# Patient Record
Sex: FEMALE | Race: WHITE | Employment: FULL TIME | URBAN - METROPOLITAN AREA
[De-identification: names, ages, dates, MRNs, and addresses within clinical notes are randomized per-mention and may not be internally consistent; named-entity substitution may affect disease eponyms.]

---

## 2017-01-18 PROBLEM — Z3A.30 30 WEEKS GESTATION OF PREGNANCY: Status: ACTIVE | Noted: 2017-01-18

## 2017-02-23 PROBLEM — Z3A.35 35 WEEKS GESTATION OF PREGNANCY: Status: ACTIVE | Noted: 2017-02-23

## 2017-02-23 PROBLEM — Z3A.30 30 WEEKS GESTATION OF PREGNANCY: Status: RESOLVED | Noted: 2017-01-18 | Resolved: 2017-02-23

## 2018-07-17 ENCOUNTER — HOSPITAL ENCOUNTER (EMERGENCY)
Age: 33
Discharge: HOME | End: 2018-07-17
Payer: COMMERCIAL

## 2018-07-17 VITALS
TEMPERATURE: 98.7 F | BODY MASS INDEX: 39.65 KG/M2 | WEIGHT: 210 LBS | OXYGEN SATURATION: 99 % | DIASTOLIC BLOOD PRESSURE: 84 MMHG | HEIGHT: 61 IN | HEART RATE: 94 BPM | RESPIRATION RATE: 16 BRPM | SYSTOLIC BLOOD PRESSURE: 137 MMHG

## 2018-07-17 DIAGNOSIS — J02.9 PHARYNGITIS, UNSPECIFIED ETIOLOGY: Primary | ICD-10-CM

## 2018-07-17 DIAGNOSIS — R59.1 LYMPHADENOPATHY: ICD-10-CM

## 2018-07-17 PROCEDURE — 99282 EMERGENCY DEPT VISIT SF MDM: CPT

## 2018-07-17 ASSESSMENT — PAIN SCALES - GENERAL: PAINLEVEL_OUTOF10: 2

## 2018-07-17 ASSESSMENT — PAIN DESCRIPTION - DESCRIPTORS: DESCRIPTORS: ACHING

## 2018-07-17 ASSESSMENT — PAIN DESCRIPTION - PAIN TYPE: TYPE: ACUTE PAIN

## 2019-04-11 ENCOUNTER — INITIAL PRENATAL (OUTPATIENT)
Dept: OBGYN CLINIC | Age: 34
End: 2019-04-11
Payer: COMMERCIAL

## 2019-04-11 VITALS
SYSTOLIC BLOOD PRESSURE: 130 MMHG | BODY MASS INDEX: 41.45 KG/M2 | DIASTOLIC BLOOD PRESSURE: 85 MMHG | HEART RATE: 104 BPM | WEIGHT: 219.38 LBS

## 2019-04-11 DIAGNOSIS — D25.1 INTRAMURAL AND SUBMUCOUS LEIOMYOMA OF UTERUS: ICD-10-CM

## 2019-04-11 DIAGNOSIS — O09.299 HISTORY OF GESTATIONAL DIABETES IN PRIOR PREGNANCY, CURRENTLY PREGNANT: ICD-10-CM

## 2019-04-11 DIAGNOSIS — O09.529 ANTEPARTUM MULTIGRAVIDA OF ADVANCED MATERNAL AGE: ICD-10-CM

## 2019-04-11 DIAGNOSIS — Z3A.25 25 WEEKS GESTATION OF PREGNANCY: Primary | ICD-10-CM

## 2019-04-11 DIAGNOSIS — O34.219 PREVIOUS CESAREAN DELIVERY AFFECTING PREGNANCY, ANTEPARTUM: ICD-10-CM

## 2019-04-11 DIAGNOSIS — Z31.5 ENCOUNTER FOR PROCREATIVE GENETIC COUNSELING: ICD-10-CM

## 2019-04-11 DIAGNOSIS — D25.0 INTRAMURAL AND SUBMUCOUS LEIOMYOMA OF UTERUS: ICD-10-CM

## 2019-04-11 DIAGNOSIS — Z86.32 HISTORY OF GESTATIONAL DIABETES IN PRIOR PREGNANCY, CURRENTLY PREGNANT: ICD-10-CM

## 2019-04-11 LAB
GLUCOSE URINE, POC: NEGATIVE
PROTEIN UA: POSITIVE

## 2019-04-11 PROCEDURE — 81002 URINALYSIS NONAUTO W/O SCOPE: CPT | Performed by: OBSTETRICS & GYNECOLOGY

## 2019-04-11 PROCEDURE — 99212 OFFICE O/P EST SF 10 MIN: CPT | Performed by: OBSTETRICS & GYNECOLOGY

## 2019-04-11 PROCEDURE — 99201 HC NEW PT, E/M LEVEL 1: CPT | Performed by: OBSTETRICS & GYNECOLOGY

## 2019-04-11 PROCEDURE — 76811 OB US DETAILED SNGL FETUS: CPT | Performed by: OBSTETRICS & GYNECOLOGY

## 2019-04-11 PROCEDURE — 76817 TRANSVAGINAL US OBSTETRIC: CPT | Performed by: OBSTETRICS & GYNECOLOGY

## 2019-04-11 NOTE — PATIENT INSTRUCTIONS
more sessions each day. Ease or reduce swelling in your feet, ankles, hands, and fingers  · If your fingers are puffy, take off your rings. · Do not eat high-salt foods, such as potato chips. · Prop up your feet on a stool or couch as much as possible. Sleep with pillows under your feet. · Do not stand for long periods of time or wear tight shoes. · Wear support stockings. Where can you learn more? Go to https://Wallerius.[employer] [place](BAI). org and sign in to your [employer] account. Enter G264 in the [facility] box to learn more about \"Weeks 22 to 26 of Your Pregnancy: Care Instructions. \"     If you do not have an account, please click on the \"Sign Up Now\" link. Current as of: September 5, 2018  Content Version: 11.9  © 3842-6987 coresystems. Care instructions adapted under license by [facility] ([facility]). If you have questions about a medical condition or this instruction, always ask your healthcare professional. [place] [address] any warranty or liability for your use of this information. Patient Education        Learning About When to Call Your Doctor During Pregnancy (After 20 Weeks)  Your Care Instructions  It's common to have concerns about what might be a problem during pregnancy. Although most pregnant women don't have any serious problems, it's important to know when to call your doctor if you have certain symptoms or signs of labor. These are general suggestions. Your doctor may give you some more information about when to call. When to call your doctor (after 20 weeks)  Call 911 anytime you think you may need emergency care. For example, call if:  · You have severe vaginal bleeding. · You have sudden, severe pain in your belly. · You passed out (lost consciousness). · You have a seizure. · You see or feel the umbilical cord.   · You think you are about to deliver your baby and can't make it safely to the hospital.  Call your doctor now or seek

## 2019-04-11 NOTE — PROGRESS NOTES
[facility] ([place], [place]) Fetal Medicine  [address]  [address]  Office [phone]  Fax [phone]    2019    RE:  Mirella Guzman     : 1985   AGE: 26 y.o. Dear Dr. Chay Villagomez,    Thank you for allowing me to provide prenatal consultation for Mirella Guzman. As I'm sure you will recall, Mirella Guzman is a 26 y.o. -0-0-1 No LMP recorded. Patient is pregnant. Estimated Date of Delivery: 19 at 25 weeks seen in our office today for the following: Detailed fetal anatomy exam.    REASON FOR CONSULTATION:  Patient Active Problem List    Diagnosis Date Noted    Previous  delivery affecting pregnancy, antepartum 2019    History of gestational diabetes in prior pregnancy, currently pregnant 2019    Antepartum multigravida of advanced maternal age 2019    Encounter for procreative genetic counseling 2019    25 weeks gestation of pregnancy 2016    Uterine leiomyoma 2016      PAST HISTORY:  OB History    Para Term  AB Living   2 1 1     1   SAB TAB Ectopic Molar Multiple Live Births             1      # Outcome Date GA Lbr Roberto/2nd Weight Sex Delivery Anes PTL Lv   2 Current            1 Term 17 39w4d  6 lb 15 oz (3.147 kg) M CS-LTranv EPI  CAR    GYNECOLOGICAL:  Last pap smear: Normal  Abnormal pap smears requiring surgical treatment, Yes, LEEP procedure. .  Sexually transmitted diseases: negative  negative for HPV, and Negative for other sexually transmitted diseases. MEDICAL:  Past Medical History:   Diagnosis Date    Abnormal Pap smear of cervix     leep procedure     Allergic     Complication of anesthesia     nausea    Diabetes mellitus ([address]     gestational Diabetes with first pregnancy     SURGICAL:   Positive for previous ,  knee surgery X2, neck lymph node excision-benign;  No other surgery    ALLERGIES:    Allergies   Allergen Reactions    Bactrim [Sulfamethoxazole-Trimethoprim] Itching and Rash    INFECTION HISTORY:  OB Infection History    High Risk Hepatitis B/Immunized? No     Live with Someone with or Exposed to TB? No     Patient or Partner has Hx of Genital Herpes? No     Rash or Viral Illness Since LMP? No     History of STD/GC/Chlamydia/HPV/Syphilis? No      Armando Tran was seen in our office today. She has no complaints today but is here for a detailed fetal anatomy exam and advanced maternal age. She reports good fetal movement and no bleeding or fluid leaking. This is the patient's 2nd pregnancy. Her 1st pregnancy was complicated by gestational diabetes. Insulin was administered 3 times a day for glucose control. She also had an 8 cm myoma in the anterior uterine wall which caused pain during her pregnancy. There was some concern about fetal growth and she was induced at 39 weeks gestation. She dilated to 6 cm and then made no further progress. A primary  section was performed for a living baby boy who weighed 6 lbs. 15 oz. PHYSICAL EXAMINATION:  /85   Pulse 104   Wt 219 lb 6 oz (99.5 kg)   Body mass index is 41.45 kg/m². General Appearance: Healthy looking, alert, no acute distress. Eyes: No pallor, no icterus, no photophobia. Abdomen: Soft, non-tender. Extremities: No pretibial pitting edema  Pelvic: deferred  Urine dipstick:  Results for POC orders placed in visit on 19   POCT urine qual dipstick protein   Result Value Ref Range    Protein, UA Positive Negative   POCT urine qual dipstick glucose   Result Value Ref Range    Glucose, UA POC negative      An ultrasound evaluation was done in our office today. Please refer to the enclosed copy of the ultrasound report for further information. Lab Work Review:  reviewed    IMPRESSION:  1) Single intrauterine gestation at 25 weeks with fetal biometry consistent with dates. 2) The fetal anatomy appears normal and the fetal lie is BREECH.   3) The amniotic fluid volume is WNL and the placenta is fundal, posterior. 4) Advanced maternal age- NIPT negative, XX.  11) Previous  section. 6) History of gestational diabetes. 7) Uterine myoma. 8) The cervix is long and closed by transvaginal ultrasound. RECOMMENDATIONS:  Each of the recommendations were discussed with the patient:  1) Single intrauterine gestation at 25 weeks with fetal biometry consistent with dates   2) Normal fetal anatomy and amniotic fluid volume WNL. 3) BREECH lie. 4) Advanced maternal age- NIPT negative, XX.  11) Previous  section. 6) History of gestational diabetes requiring insulin therapy. She is scheduled for a repeat carbohydrate screening test.  7) Uterine myoma. There is an anterior uterine wall myoma that measures 5.5 x 5.3 x 2.6 cm's. 8) The cervix is long and closed by transvaginal ultrasound. 9) I suggested the patient return for a follow-up assessment for fetal growth in 6 weeks unless there is a clinical reason for her to return prior to that time. She is to call if she has any problems or questions prior to her next visit. Further evaluation and management will be dependent on her clinical presentation and the results of her testing. The patient is to continue to follow with you in your office for ongoing obstetric care.     PLAN:    As noted above or sooner prn. Sincerely,        Carine Clark MD    I spent 20 minutes of direct contact time with the patient of which greater than 50% of the time was used to  the patient, discuss complications and problems related to her pregnancy, or coordinating her care.  I answered all of her questions to her satisfaction.

## 2019-04-11 NOTE — PROGRESS NOTES
States baby is moving  Denies bleeding, leakage of fluid or contractions    Call your obstetrician for:    Bleeding, leakage of fluid, abdominal tenderness, headaches, burred vision or  epigastric pain or decreased fetal movement or increased in urinary frequency.    Call if any questions or concerns

## 2019-05-23 ENCOUNTER — ROUTINE PRENATAL (OUTPATIENT)
Dept: OBGYN CLINIC | Age: 34
End: 2019-05-23
Payer: COMMERCIAL

## 2019-05-23 VITALS
SYSTOLIC BLOOD PRESSURE: 114 MMHG | HEART RATE: 99 BPM | WEIGHT: 217.5 LBS | BODY MASS INDEX: 41.1 KG/M2 | DIASTOLIC BLOOD PRESSURE: 82 MMHG

## 2019-05-23 DIAGNOSIS — Z3A.31 31 WEEKS GESTATION OF PREGNANCY: Primary | ICD-10-CM

## 2019-05-23 DIAGNOSIS — D25.0 INTRAMURAL AND SUBMUCOUS LEIOMYOMA OF UTERUS: ICD-10-CM

## 2019-05-23 DIAGNOSIS — O09.529 ANTEPARTUM MULTIGRAVIDA OF ADVANCED MATERNAL AGE: ICD-10-CM

## 2019-05-23 DIAGNOSIS — O24.414 INSULIN CONTROLLED GESTATIONAL DIABETES MELLITUS (GDM) IN THIRD TRIMESTER: ICD-10-CM

## 2019-05-23 DIAGNOSIS — Z86.32 HISTORY OF GESTATIONAL DIABETES IN PRIOR PREGNANCY, CURRENTLY PREGNANT: ICD-10-CM

## 2019-05-23 DIAGNOSIS — O34.219 PREVIOUS CESAREAN DELIVERY AFFECTING PREGNANCY, ANTEPARTUM: ICD-10-CM

## 2019-05-23 DIAGNOSIS — D25.1 INTRAMURAL AND SUBMUCOUS LEIOMYOMA OF UTERUS: ICD-10-CM

## 2019-05-23 DIAGNOSIS — O09.299 HISTORY OF GESTATIONAL DIABETES IN PRIOR PREGNANCY, CURRENTLY PREGNANT: ICD-10-CM

## 2019-05-23 LAB
GLUCOSE URINE, POC: NORMAL
PROTEIN UA: NEGATIVE

## 2019-05-23 PROCEDURE — 81002 URINALYSIS NONAUTO W/O SCOPE: CPT | Performed by: OBSTETRICS & GYNECOLOGY

## 2019-05-23 PROCEDURE — 99211 OFF/OP EST MAY X REQ PHY/QHP: CPT | Performed by: OBSTETRICS & GYNECOLOGY

## 2019-05-23 PROCEDURE — 76819 FETAL BIOPHYS PROFIL W/O NST: CPT | Performed by: OBSTETRICS & GYNECOLOGY

## 2019-05-23 PROCEDURE — 99213 OFFICE O/P EST LOW 20 MIN: CPT | Performed by: OBSTETRICS & GYNECOLOGY

## 2019-05-23 PROCEDURE — 76816 OB US FOLLOW-UP PER FETUS: CPT | Performed by: OBSTETRICS & GYNECOLOGY

## 2019-05-23 NOTE — PROGRESS NOTES
Will send blood sugar log, now on insulin and metformin  Patient states baby is moving, denies bleeding, leakage of fluid or contractions    Call your obstetrician for:    Bleeding, leakage of fluid, abdominal tenderness, headaches, burred vision or  epigastric pain or decreased fetal movement or increased in urinary frequency.    Call if any questions or concerns

## 2019-05-23 NOTE — PATIENT INSTRUCTIONS
Patient Education        Weeks 30 to 28 of Your Pregnancy: Care Instructions  Your Care Instructions    You have made it to the final months of your pregnancy. By now, your baby is really starting to look like a baby, with hair and plump skin. As you enter the final weeks of pregnancy, the reality of having a baby may start to set in. This is the time to settle on a name, get your household in order, set up a safe nursery, and find quality  if needed. Doing these things in advance will allow you to focus on caring for and enjoying your new baby. You may also want to have a tour of your hospital's labor and delivery unit to get a better idea of what to expect while you are in the hospital.  During these last months, it is very important to take good care of yourself and pay attention to what your body needs. If your doctor says it is okay for you to work, don't push yourself too hard. Use the tips provided in this care sheet to ease heartburn and care for varicose veins. If you haven't already had the Tdap shot during this pregnancy, talk to your doctor about getting it. It will help protect your  against pertussis infection. Follow-up care is a key part of your treatment and safety. Be sure to make and go to all appointments, and call your doctor if you are having problems. It's also a good idea to know your test results and keep a list of the medicines you take. How can you care for yourself at home? Pay attention to your baby's movements  · You should feel your baby move several times every day. · Your baby now turns less, and kicks and jabs more. · Your baby sleeps 20 to 45 minutes at a time and is more active at certain times of day. · If your doctor wants you to count your baby's kicks:  ? Empty your bladder, and lie on your side or relax in a comfortable chair. ? Write down your start time. ? Pay attention only to your baby's movements. Count any movement except hiccups. ?  After you have counted 10 movements, write down your stop time. ? Write down how many minutes it took for your baby to move 10 times. ? If an hour goes by and you have not recorded 10 movements, have something to eat or drink and then count for another hour. If you do not record 10 movements in either hour, call your doctor. Ease heartburn  · Eat small, frequent meals. · Do not eat chocolate, peppermint, or very spicy foods. Avoid drinks with caffeine, such as coffee, tea, and sodas. · Avoid bending over or lying down after meals. · Talk a short walk after you eat. · If heartburn is a problem at night, do not eat for 2 hours before bedtime. · Take antacids like Mylanta, Maalox, Rolaids, or Tums. Do not take antacids that have sodium bicarbonate. Care for varicose veins  · Varicose veins are blood vessels that stretch out with the extra blood during pregnancy. Your legs may ache or throb. Most varicose veins will go away after the birth. · Avoid standing for long periods of time. Sit with your legs crossed at the ankles, not the knees. · Sit with your feet propped up. · Avoid tight clothing or stockings. Wear support hose. · Exercise regularly. Try walking for at least 30 minutes a day. Where can you learn more? Go to https://Cardium TherapeuticsrosaReduxio.Karma Snap. org and sign in to your [employer] account. Enter G533 in the [facility] box to learn more about \"Weeks 30 to 32 of Your Pregnancy: Care Instructions. \"     If you do not have an account, please click on the \"Sign Up Now\" link. Current as of: September 5, 2018  Content Version: 12.0  © 7443-0523 [employer]. Care instructions adapted under license by [facility] ([facility]). If you have questions about a medical condition or this instruction, always ask your healthcare professional. [address] any warranty or liability for your use of this information.          Patient Education        Learning About When to Call Your Doctor During Pregnancy (After 20 Weeks)  Your Care Instructions  It's common to have concerns about what might be a problem during pregnancy. Although most pregnant women don't have any serious problems, it's important to know when to call your doctor if you have certain symptoms or signs of labor. These are general suggestions. Your doctor may give you some more information about when to call. When to call your doctor (after 20 weeks)  Call 911 anytime you think you may need emergency care. For example, call if:  · You have severe vaginal bleeding. · You have sudden, severe pain in your belly. · You passed out (lost consciousness). · You have a seizure. · You see or feel the umbilical cord. · You think you are about to deliver your baby and can't make it safely to the hospital.  Call your doctor now or seek immediate medical care if:  · You have vaginal bleeding. · You have belly pain. · You have a fever. · You have symptoms of preeclampsia, such as:  ? Sudden swelling of your face, hands, or feet. ? New vision problems (such as dimness or blurring). ? A severe headache. · You have a sudden release of fluid from your vagina. (You think your water broke.)  · You think that you may be in labor. This means that you've had at least 4 contractions within 20 minutes or at least 8 contractions in an hour. · You notice that your baby has stopped moving or is moving much less than normal.  · You have symptoms of a urinary tract infection. These may include:  ? Pain or burning when you urinate. ? A frequent need to urinate without being able to pass much urine. ? Pain in the flank, which is just below the rib cage and above the waist on either side of the back. ? Blood in your urine. Watch closely for changes in your health, and be sure to contact your doctor if:  · You have vaginal discharge that smells bad. · You have skin changes, such as:  ? A rash. ? Itching. ? Yellow color to your skin.   · You have other concerns about your pregnancy. If you have labor signs at 37 weeks or more  If you have signs of labor at 37 weeks or more, your doctor may tell you to call when your labor becomes more active. Symptoms of active labor include:  · Contractions that are regular. · Contractions that are less than 5 minutes apart. · Contractions that are hard to talk through. Follow-up care is a key part of your treatment and safety. Be sure to make and go to all appointments, and call your doctor if you are having problems. It's also a good idea to know your test results and keep a list of the medicines you take. Where can you learn more? Go to https://Cenifypepiceweb.Ar[employer] Systems. org and sign in to your simpleFLOORS account. Enter  in the [facility] box to learn more about \"Learning About When to Call Your Doctor During Pregnancy (After 20 Weeks). \"     If you do not have an account, please click on the \"Sign Up Now\" link. Current as of: September 5, 2018  Content Version: 12.0  © 6432-6710 [employer]. Care instructions adapted under license by [facility] ([facility]). If you have questions about a medical condition or this instruction, always ask your healthcare professional. [place] [address] any warranty or liability for your use of this information. Patient Education        Counting Your Baby's Kicks: Care Instructions  Your Care Instructions    Counting your baby's kicks is one way your doctor can tell that your baby is healthy. Most women--especially in a first pregnancy--feel their baby move for the first time between 16 and 22 weeks. The movement may feel like flutters rather than kicks. Your baby may move more at certain times of the day. When you are active, you may notice less kicking than when you are resting. At your prenatal visits, your doctor will ask whether the baby is active.   In your last trimester, your doctor may ask you to count the number of times you feel your baby move. Follow-up care is a key part of your treatment and safety. Be sure to make and go to all appointments, and call your doctor if you are having problems. It's also a good idea to know your test results and keep a list of the medicines you take. How do you count fetal kicks? · A common method of checking your baby's movement is to count the number of kicks or moves you feel in 1 hour. Ten movements (such as kicks, flutters, or rolls) in 1 hour are normal. Some doctors suggest that you count in the morning until you get to 10 movements. Then you can quit for that day and start again the next day. · Pick your baby's most active time of day to count. This may be any time from morning to evening. · If you do not feel 10 movements in an hour, your baby may be sleeping. Wait for the next hour and count again. When should you call for help? Call your doctor now or seek immediate medical care if:    · You noticed that your baby has stopped moving or is moving much less than normal.    Watch closely for changes in your health, and be sure to contact your doctor if you have any problems. Where can you learn more? Go to https://blueKiwi Software.PhishLabs. org and sign in to your [employer] (T.O.H.L.) account. Enter K453 in the [facility] box to learn more about \"Counting Your Baby's Kicks: Care Instructions. \"     If you do not have an account, please click on the \"Sign Up Now\" link. Current as of: September 5, 2018  Content Version: 12.0  © 2807-0020 Healthwise, [employer]orporated. Care instructions adapted under license by [facility] [employer] [facility] ([facility]). If you have questions about a medical condition or this instruction, always ask your healthcare professional. [place] [address] any warranty or liability for your use of this information.

## 2019-05-23 NOTE — PROGRESS NOTES
[facility] Fetal Medicine  [facility] [address]  Office [phone]  Fax [phone]    2019    RE:  Dania Vides     : 1985   AGE: 26 y.o. Dear Dr. Arik Newby,    Thank you for allowing me to provide prenatal consultation for Dania Vides. As I'm sure you will recall, Dania Vides is a 26 y.o. -0-0-1 No LMP recorded. Patient is pregnant. Estimated Date of Delivery: 19 at 31 weeks seen in our office today for the following: Fetal growth exam and gestational diabetes. REASON FOR CONSULTATION:  Patient Active Problem List    Diagnosis Date Noted    31 weeks gestation of pregnancy 2019    Insulin controlled gestational diabetes mellitus (GDM) in third trimester 2019    Previous  delivery affecting pregnancy, antepartum 2019    History of gestational diabetes in prior pregnancy, currently pregnant 2019    Antepartum multigravida of advanced maternal age 2019    Encounter for procreative genetic counseling 2019    Uterine leiomyoma 2016        PAST HISTORY:  OB History    Para Term  AB Living   2 1 1     1   SAB TAB Ectopic Molar Multiple Live Births             1      # Outcome Date GA Lbr Roberto/2nd Weight Sex Delivery Anes PTL Lv   2 Current            1 Term 17 39w4d  6 lb 15 oz (3.147 kg) M CS-LTranv EPI  CAR    GYNECOLOGICAL:  Last pap smear: Normal  Abnormal pap smears requiring surgical treatment, Yes, LEEP procedure. .  Sexually transmitted diseases: negative  negative for HPV, and Negative for other sexually transmitted diseases. MEDICAL:  Past Medical History:   Diagnosis Date    Abnormal Pap smear of cervix     leep procedure     Allergic     Complication of anesthesia     nausea    Diabetes mellitus ([address]     gestational Diabetes with first pregnancy     SURGICAL:   Positive for previous ,  knee surgery X2, neck lymph node excision-benign;  No other Review:  reviewed    IMPRESSION:  1) Single intrauterine gestation at 31 weeks with fetal biometry consistent with dates. 2) The fetal anatomy appears normal and the fetal lie is now VERTEX. 3) The amniotic fluid volume is WNL and the placenta is fundal, posterior. 4) Advanced maternal age- NIPT negative, XX.  11) Previous  section. 6) History of gestational diabetes with insulin dependent gestational diabetes now. 7) Uterine myoma- not identified today. 8) The umbilical artery Doppler S/D ratio is wnl. RECOMMENDATIONS:  Each of the recommendations were discussed with the patient:  1) Single intrauterine gestation at 31 weeks with fetal biometry consistent with dates   2) Normal fetal anatomy and amniotic fluid volume WNL. 3) VERTEX lie. 4) Advanced maternal age- NIPT negative, XX.  11) Previous  section. 6) History of gestational diabetes requiring insulin therapy. Her 3 hour OGTT was abnormal and she is currently on Insulin and Metformin as described above. 7) Uterine myoma-not identified today. 8) The umbilical artery Doppler S/D ratio is wnl.  9) I suggested the patient return for a follow-up assessment for fetal growth in 4-6 weeks unless there is a clinical reason for her to return prior to that time. She is to call if she has any problems or questions prior to her next visit. Further evaluation and management will be dependent on her clinical presentation and the results of her testing. The patient is to continue to follow with you in your office for ongoing obstetric care.     PLAN:    As noted above or sooner prn. Sincerely,        Jazmyne Jefferson MD    I spent 15 minutes of direct contact time with the patient of which greater than 50% of the time was used to  the patient, discuss complications and problems related to her pregnancy, or coordinating her care.  I answered all of her questions to her satisfaction.

## 2019-06-27 ENCOUNTER — ROUTINE PRENATAL (OUTPATIENT)
Dept: OBGYN CLINIC | Age: 34
End: 2019-06-27
Payer: COMMERCIAL

## 2019-06-27 VITALS
SYSTOLIC BLOOD PRESSURE: 140 MMHG | WEIGHT: 225.13 LBS | HEART RATE: 81 BPM | BODY MASS INDEX: 42.54 KG/M2 | DIASTOLIC BLOOD PRESSURE: 88 MMHG

## 2019-06-27 DIAGNOSIS — D25.1 INTRAMURAL AND SUBMUCOUS LEIOMYOMA OF UTERUS: ICD-10-CM

## 2019-06-27 DIAGNOSIS — O24.414 INSULIN CONTROLLED GESTATIONAL DIABETES MELLITUS (GDM) IN THIRD TRIMESTER: ICD-10-CM

## 2019-06-27 DIAGNOSIS — O09.299 HISTORY OF GESTATIONAL DIABETES IN PRIOR PREGNANCY, CURRENTLY PREGNANT: ICD-10-CM

## 2019-06-27 DIAGNOSIS — Z86.32 HISTORY OF GESTATIONAL DIABETES IN PRIOR PREGNANCY, CURRENTLY PREGNANT: ICD-10-CM

## 2019-06-27 DIAGNOSIS — Z3A.36 36 WEEKS GESTATION OF PREGNANCY: Primary | ICD-10-CM

## 2019-06-27 DIAGNOSIS — O34.219 PREVIOUS CESAREAN DELIVERY AFFECTING PREGNANCY, ANTEPARTUM: ICD-10-CM

## 2019-06-27 DIAGNOSIS — D25.0 INTRAMURAL AND SUBMUCOUS LEIOMYOMA OF UTERUS: ICD-10-CM

## 2019-06-27 DIAGNOSIS — O09.529 ANTEPARTUM MULTIGRAVIDA OF ADVANCED MATERNAL AGE: ICD-10-CM

## 2019-06-27 LAB
GLUCOSE URINE, POC: NORMAL
PROTEIN UA: POSITIVE

## 2019-06-27 PROCEDURE — 99213 OFFICE O/P EST LOW 20 MIN: CPT | Performed by: OBSTETRICS & GYNECOLOGY

## 2019-06-27 PROCEDURE — 99211 OFF/OP EST MAY X REQ PHY/QHP: CPT | Performed by: OBSTETRICS & GYNECOLOGY

## 2019-06-27 PROCEDURE — 81002 URINALYSIS NONAUTO W/O SCOPE: CPT | Performed by: OBSTETRICS & GYNECOLOGY

## 2019-06-27 PROCEDURE — 76816 OB US FOLLOW-UP PER FETUS: CPT | Performed by: OBSTETRICS & GYNECOLOGY

## 2019-06-27 PROCEDURE — 76819 FETAL BIOPHYS PROFIL W/O NST: CPT | Performed by: OBSTETRICS & GYNECOLOGY

## 2019-06-27 NOTE — PROGRESS NOTES
[facility] Fetal Medicine  [address]  Tadeo Burrell  Office [phone]  Fax [phone]    2019    RE:  Edward Closs     : 1985   AGE: 26 y.o. Dear Dr. Basilio Dan,    Thank you for allowing me to provide prenatal consultation for Edward Closs. As I'm sure you will recall, Edward Closs is a 26 y.o. -0-0-1 No LMP recorded. Patient is pregnant. Estimated Date of Delivery: 19 at 36 weeks seen in our office today for the following: Fetal growth exam and gestational diabetes. REASON FOR CONSULTATION:  Patient Active Problem List    Diagnosis Date Noted    36 weeks gestation of pregnancy 2019    Insulin controlled gestational diabetes mellitus (GDM) in third trimester 2019    Previous  delivery affecting pregnancy, antepartum 2019    History of gestational diabetes in prior pregnancy, currently pregnant 2019    Antepartum multigravida of advanced maternal age 2019    Encounter for procreative genetic counseling 2019    Uterine leiomyoma 2016        PAST HISTORY:  OB History    Para Term  AB Living   2 1 1     1   SAB TAB Ectopic Molar Multiple Live Births             1      # Outcome Date GA Lbr Roberto/2nd Weight Sex Delivery Anes PTL Lv   2 Current            1 Term 17 39w4d  6 lb 15 oz (3.147 kg) M CS-LTranv EPI  CAR    GYNECOLOGICAL:  Last pap smear: Normal  Abnormal pap smears requiring surgical treatment, Yes, LEEP procedure. .  Sexually transmitted diseases: negative  negative for HPV, and Negative for other sexually transmitted diseases. MEDICAL:  Past Medical History:   Diagnosis Date    Abnormal Pap smear of cervix     leep procedure     Allergic     Complication of anesthesia     nausea    Diabetes mellitus ([address]     gestational Diabetes with first pregnancy     SURGICAL:   Positive for previous ,  knee surgery X2, neck lymph node excision-benign;  No other surgery    ALLERGIES:    Allergies   Allergen Reactions    Bactrim [Sulfamethoxazole-Trimethoprim] Itching and Rash    Erythromycin Nausea And Vomiting     MEDICATIONS:    Current Outpatient Medications   Medication Sig Dispense Refill    metFORMIN (GLUCOPHAGE) 500 MG tablet Take 500 mg by mouth 3 times daily (with meals)      insulin NPH (HUMULIN N;NOVOLIN N) 100 UNIT/ML injection vial Inject 20 Units into the skin      insulin NPH (HUMULIN N;NOVOLIN N) 100 UNIT/ML injection vial Inject 10 Units into the skin nightly      insulin aspart (NOVOLOG) 100 UNIT/ML injection vial Inject 6 Units into the skin 3 times daily (before meals)      Prenatal MV-Min-Fe Fum-FA-DHA (PRENATAL 1 PO) Take 1 tablet by mouth daily       No current facility-administered medications for this visit. Socioeconomic History    Marital status:      Spouse name: None    Number of children: 1    Years of education: None    Highest education level: None   Tobacco Use    Smoking status: Never Smoker    Smokeless tobacco: Never Used   Substance and Sexual Activity    Alcohol use: Not Currently     Comment: rare    Drug use: No    Sexual activity: Yes     Partners: Male     Review of Systems :   CONSTITUTIONAL : No fever, no chills   HEENT : No headache, no visual changes, no sore throat   CARDIOVASCULAR : No pain, no palpitations, some feet edema   RESPIRATORY : No pain, some shortness of breath with activity  GASTROINTESTINAL : No N/V, no D/C, no abdominal pain   GENITOURINARY : No dysuria, hematuria and no incontinence   MUSCULOSKELETAL : No myalgia, Chronic back pain  NEUROLOGICAL : hands are swollen and numb at times, no tremors. No history of seizures  ALL OTHER SYSTEMS WERE REPORTED AS NEGATIVE. FAMILY MEDICAL HISTORY:   No family history on file.      OB GENETIC SCREEN:  OB Genetic Screening    Patient's Age 35+ at Date of Delivery Yes, NIPT negative, XX     Maternal Metabolic Disorder Yes, gestational diabetes with her last pregnancy     Dania Vides was seen in our office today. She has no complaints today but is here for a fetal growth exam, advanced maternal age and diagnosis of gestational diabetes. She reports good fetal movement and no bleeding or fluid leaking. This is the patient's 2nd pregnancy. Her 1st pregnancy was complicated by gestational diabetes. Insulin was administered 3 times a day for glucose control. She also had an 8 cm myoma in the anterior uterine wall which caused pain during her pregnancy. There was some concern about fetal growth and she was induced at 39 weeks gestation. She dilated to 6 cm and then made no further progress. A primary  section was performed for a living baby boy who weighed 6 lbs. 15 oz. In this pregnancy, she was recently diagnosed with gestational diabetes. Insulin was started and she is currently taking Humulin 20 units in the morning and 10 units at bedtime. She also takes metformin 500 mg with each meal.  She states that her fasting glucose this morning was 88 mg/dl and postprandial values range from 107 to 118 mg/dl. PHYSICAL EXAMINATION:  BP (!) 140/88   Pulse 81   Wt 225 lb 2 oz (102.1 kg)   Body mass index is 42.54 kg/m². General Appearance: Healthy looking, alert, no acute distress. Eyes: No pallor, no icterus, no photophobia. Abdomen: Soft, non-tender. Extremities: mild pretibial pitting edema  Pelvic: deferred  Urine dipstick:  Results for POC orders placed in visit on 19   POCT urine qual dipstick protein   Result Value Ref Range    Protein, UA Positive (A) Negative   POCT urine qual dipstick glucose   Result Value Ref Range    Glucose, UA POC neg      An ultrasound evaluation was done in our office today. Please refer to the enclosed copy of the ultrasound report for further information. Lab Work Review:  reviewed    IMPRESSION:  1) Single intrauterine gestation at 39 weeks with fetal biometry approx.  10 days less than dates. 2) The fetal anatomy appears normal and the fetal lie is now VERTEX. 3) The amniotic fluid volume is low normal (RUBEN 6.7- previously 12.1) and the placenta is fundal, posterior. 4) Advanced maternal age- NIPT negative, XX.  11) Previous  section. 6) History of gestational diabetes with insulin dependent gestational diabetes now. 7) Uterine myoma- not identified today. 8) The umbilical artery Doppler S/D ratio is wnl. RECOMMENDATIONS:  Each of the recommendations were discussed with the patient:  1) Single intrauterine gestation at 31 weeks with fetal biometry consistent with dates   2) Normal fetal anatomy and amniotic fluid volume is low normal (RUBEN 6.7- previously 12.1). I have asked her to increase her oral fluids and to return for an RUBEN in 4 to 5 days. 3) VERTEX lie. 4) Advanced maternal age- NIPT negative, XX.  11) Previous  section. 6) Gestational diabetes requiring insulin therapy and Metformin as described above. 7) Uterine myoma-small anterior wall measuring 1.5 cms. 8) The umbilical artery Doppler S/D ratio is wnl. 9) BP today 140/88. She has no history of hypertension. So I suggest close monitoring of her blood pressure in addition to her amniotic fluid volume. 10) I suggested the patient return for a follow-up assessment of the amniotic fluid volume and umbilical artery Doppler S/D ratio in 4-5 days unless there is a clinical reason for her to return prior to that time. She is to call if she has any problems or questions prior to her next visit. Further evaluation and management will be dependent on her clinical presentation and the results of her testing. The patient is to continue to follow with you in your office for ongoing obstetric care.     PLAN:    As noted above or sooner prn.     Sincerely,        Leilani Rockwell MD    I spent 15 minutes of direct contact time with the patient of which greater than 50% of the time was used to  the patient, discuss complications and problems related to her pregnancy, or coordinating her care.  I answered all of her questions to her satisfaction.

## 2019-06-27 NOTE — PROGRESS NOTES
States baby is moving  Does blood sugars, given each visit to DR Chay Villagomez  Denies bleeding, leakage of fluid or contractions    Call your obstetrician for:    Bleeding, leakage of fluid, abdominal tenderness, headaches, burred vision or  epigastric pain or decreased fetal movement or increased in urinary frequency.    Call if any questions or concerns

## 2019-06-27 NOTE — PATIENT INSTRUCTIONS
Patient Education        Weeks 34 to 39 of Your Pregnancy: Care Instructions  Your Care Instructions    By now, your baby and your belly have grown quite large. It is almost time to give birth. A full-term pregnancy can deliver between 37 and 42 weeks. Your baby's lungs are almost ready to breathe air. The bones in your baby's head are now firm enough to protect it, but soft enough to move down through the birth canal.  You may feel excited, happy, anxious, or scared. You may wonder how you will know if you are in labor or what to expect during labor. Try to be flexible in your expectations of the birth. Because each birth is different, there is no way to know exactly what childbirth will be like for you. This care sheet will help you know what to expect and how to prepare. This may make your childbirth easier. If you haven't already had the Tdap shot during this pregnancy, talk to your doctor about getting it. It will help protect your  against pertussis infection. In the 36th week, most women have a test for group B streptococcus (GBS). GBS is a common bacteria that can live in the vagina and rectum. It can make your baby sick after birth. If you test positive, you will get antibiotics during labor. The medicine will keep your baby from getting the bacteria. Follow-up care is a key part of your treatment and safety. Be sure to make and go to all appointments, and call your doctor if you are having problems. It's also a good idea to know your test results and keep a list of the medicines you take. How can you care for yourself at home? Learn about pain relief choices  · Pain is different for every woman. Talk with your doctor about your feelings about pain. · You can choose from several types of pain relief. These include medicine or breathing techniques, as well as comfort measures. You can use more than one option.   · If you choose to have pain medicine during labor, talk to your doctor about your options. Some medicines lower anxiety and help with some of the pain. Others make your lower body numb so that you won't feel pain. · Be sure to tell your doctor about your pain medicine choice before you start labor or very early in your labor. You may be able to change your mind as labor progresses. · Rarely, a woman is put to sleep by medicine given through a mask or an IV. Labor and delivery  · The first stage of labor has three parts: early, active, and transition. ? Most women have early labor at home. You can stay busy or rest, eat light snacks, drink clear fluids, and start counting contractions. ? When talking during a contraction gets hard, you may be moving to active labor. During active labor, you should head for the hospital if you are not there already. ? You are in active labor when contractions come every 3 to 4 minutes and last about 60 seconds. Your cervix is opening more rapidly. ? If your water breaks, contractions will come faster and stronger. ? During transition, your cervix is stretching, and contractions are coming more rapidly. ? You may want to push, but your cervix might not be ready. Your doctor will tell you when to push. · The second stage starts when your cervix is completely opened and you are ready to push. ? Contractions are very strong to push the baby down the birth canal.  ? You will feel the urge to push. You may feel like you need to have a bowel movement. ? You may be coached to push with contractions. These contractions will be very strong, but you will not have them as often. You can get a little rest between contractions. ? You may be emotional and irritable. You may not be aware of what is going on around you.  ? One last push, and your baby is born. · The third stage is when a few more contractions push out the placenta. This may take 30 minutes or less. · The fourth stage is the welcome recovery.  You may feel overwhelmed with emotions and exhausted but alert. This is a good time to start breastfeeding. Where can you learn more? Go to https://chpepiceweb.healthScodix. org and sign in to your liveBooks account. Enter A598 in the [facility] box to learn more about \"Weeks 34 to 36 of Your Pregnancy: Care Instructions. \"     If you do not have an account, please click on the \"Sign Up Now\" link. Current as of: September 5, 2018  Content Version: 12.0  © 0829-4067 Healthwise, [employer]. Care instructions adapted under license by [facility] ([facility]). If you have questions about a medical condition or this instruction, always ask your healthcare professional. [place] [address] any warranty or liability for your use of this information. Patient Education        Learning About When to Call Your Doctor During Pregnancy (After 20 Weeks)  Your Care Instructions  It's common to have concerns about what might be a problem during pregnancy. Although most pregnant women don't have any serious problems, it's important to know when to call your doctor if you have certain symptoms or signs of labor. These are general suggestions. Your doctor may give you some more information about when to call. When to call your doctor (after 20 weeks)  Call 911 anytime you think you may need emergency care. For example, call if:  · You have severe vaginal bleeding. · You have sudden, severe pain in your belly. · You passed out (lost consciousness). · You have a seizure. · You see or feel the umbilical cord. · You think you are about to deliver your baby and can't make it safely to the hospital.  Call your doctor now or seek immediate medical care if:  · You have vaginal bleeding. · You have belly pain. · You have a fever. · You have symptoms of preeclampsia, such as:  ? Sudden swelling of your face, hands, or feet. ? New vision problems (such as dimness or blurring). ? A severe headache. · You have a sudden release of fluid from your vagina.  (You think your water broke.)  · You think that you may be in labor. This means that you've had at least 4 contractions within 20 minutes or at least 8 contractions in an hour. · You notice that your baby has stopped moving or is moving much less than normal.  · You have symptoms of a urinary tract infection. These may include:  ? Pain or burning when you urinate. ? A frequent need to urinate without being able to pass much urine. ? Pain in the flank, which is just below the rib cage and above the waist on either side of the back. ? Blood in your urine. Watch closely for changes in your health, and be sure to contact your doctor if:  · You have vaginal discharge that smells bad. · You have skin changes, such as:  ? A rash. ? Itching. ? Yellow color to your skin. · You have other concerns about your pregnancy. If you have labor signs at 37 weeks or more  If you have signs of labor at 37 weeks or more, your doctor may tell you to call when your labor becomes more active. Symptoms of active labor include:  · Contractions that are regular. · Contractions that are less than 5 minutes apart. · Contractions that are hard to talk through. Follow-up care is a key part of your treatment and safety. Be sure to make and go to all appointments, and call your doctor if you are having problems. It's also a good idea to know your test results and keep a list of the medicines you take. Where can you learn more? Go to https://juan.[employer]. org and sign in to your MetaCert account. Enter  in the [employer] box to learn more about \"Learning About When to Call Your Doctor During Pregnancy (After 20 Weeks). \"     If you do not have an account, please click on the \"Sign Up Now\" link. Current as of: September 5, 2018  Content Version: 12.0  © 6085-9414 Healthwise, Incorporated. Care instructions adapted under license by [employer].  If you have questions about a medical condition or this instruction, always ask your healthcare professional. [address] any warranty or liability for your use of this information. Patient Education        Counting Your Baby's Kicks: Care Instructions  Your Care Instructions    Counting your baby's kicks is one way your doctor can tell that your baby is healthy. Most women--especially in a first pregnancy--feel their baby move for the first time between 16 and 22 weeks. The movement may feel like flutters rather than kicks. Your baby may move more at certain times of the day. When you are active, you may notice less kicking than when you are resting. At your prenatal visits, your doctor will ask whether the baby is active. In your last trimester, your doctor may ask you to count the number of times you feel your baby move. Follow-up care is a key part of your treatment and safety. Be sure to make and go to all appointments, and call your doctor if you are having problems. It's also a good idea to know your test results and keep a list of the medicines you take. How do you count fetal kicks? · A common method of checking your baby's movement is to count the number of kicks or moves you feel in 1 hour. Ten movements (such as kicks, flutters, or rolls) in 1 hour are normal. Some doctors suggest that you count in the morning until you get to 10 movements. Then you can quit for that day and start again the next day. · Pick your baby's most active time of day to count. This may be any time from morning to evening. · If you do not feel 10 movements in an hour, your baby may be sleeping. Wait for the next hour and count again. When should you call for help? Call your doctor now or seek immediate medical care if:    · You noticed that your baby has stopped moving or is moving much less than normal.    Watch closely for changes in your health, and be sure to contact your doctor if you have any problems. Where can you learn more?   Go to https://chpepiceweb.healthMBF Therapeuticspartners. org and sign in to your PayTouch account. Enter J922 in the [employer] box to learn more about \"Counting Your Baby's Kicks: Care Instructions. \"     If you do not have an account, please click on the \"Sign Up Now\" link. Current as of: September 5, 2018  Content Version: 12.0  © 8586-7292 [employer]. Care instructions adapted under license by [employer] [facility] ([facility]). If you have questions about a medical condition or this instruction, always ask your healthcare professional. [place] [address] any warranty or liability for your use of this information. Patient Education        Week 37 of Your Pregnancy: Care Instructions  Your Care Instructions    You are near the end of your pregnancy--and you're probably pretty uncomfortable. It may be harder to walk around. Lying down probably isn't comfortable either. You may have trouble getting to sleep or staying asleep. Most women deliver their babies between 40 and 41 weeks. This is a good time to think about packing a bag for the hospital with items you'll need. Then you'll be ready when labor starts. Follow-up care is a key part of your treatment and safety. Be sure to make and go to all appointments, and call your doctor if you are having problems. It's also a good idea to know your test results and keep a list of the medicines you take. How can you care for yourself at home? Learn about breastfeeding  · Breastfeeding is best for your baby and good for you. · Breast milk has antibodies to help your baby fight infections. · Mothers who breastfeed often lose weight faster, because making milk burns calories. · Learning the best ways to hold your baby will make breastfeeding easier. · Let your partner bathe and diaper the baby to keep your partner from feeling left out. Snuggle together when you breastfeed. · You may want to learn how to use a breast pump and store your milk.   · If you choose to bottle feed, make the feeding feel like breastfeeding so you can bond with your baby. Always hold your baby and the bottle. Do not prop bottles or let your baby fall asleep with a bottle. Learn about crying  · It is common for babies to cry for 1 to 3 hours a day. Some cry more, some cry less. · Babies don't cry to make you upset or because you are a bad parent. · Crying is how your baby communicates. Your baby may be hungry; have gas; need a diaper change; or feel cold, warm, tired, lonely, or tense. Sometimes babies cry for unknown reasons. · If you respond to your baby's needs, he or she will learn to trust you. · Try to stay calm when your baby cries. Your baby may get more upset if he or she senses that you are upset. Know how to care for your   · Your baby's umbilical cord stump will drop off on its own, usually between 1 and 2 weeks. To care for your baby's umbilical cord area:  ? Clean the area at the bottom of the cord 2 or 3 times a day. ? Pay special attention to the area where the cord attaches to the skin. ? Keep the diaper folded below the cord. ? Use a damp washcloth or cotton ball to sponge bathe your baby until the stump has come off. · Your baby's first dark stool is called meconium. After the meconium is passed, your baby will develop his or her own bowel pattern. ? Some babies, especially  babies, have several bowel movements a day. Others have one or two a day, or one every 2 to 3 days. ?  babies often have loose, yellow stools. Formula-fed babies have more formed stools. ? If your baby's stools look like little pellets, he or she is constipated. After 2 days of constipation, call your baby's doctor. · If your baby will be circumcised, you can care for him at home. ? Gently rinse his penis with warm water after every diaper change. Do not try to remove the film that forms on the penis. This film will go away on its own. Pat dry.   ? Put petroleum ointment, such as Vaseline, on the area of the diaper that will touch your baby's penis. This will keep the diaper from sticking to your baby. ? Ask the doctor about giving your baby acetaminophen (Tylenol) for pain. Where can you learn more? Go to https://chpepiceweb.healthBeThereRewards. org and sign in to your Beautified account. Enter [id number] in the cube19 box to learn more about \"Week 37 of Your Pregnancy: Care Instructions. \"     If you do not have an account, please click on the \"Sign Up Now\" link. Current as of: September 5, 2018  Content Version: 12.0  © 3211-3771 Healthwise, Incorporated. Care instructions adapted under license by [facility] ([facility]). If you have questions about a medical condition or this instruction, always ask your healthcare professional. [address]  any warranty or liability for your use of this information.

## 2019-06-29 ENCOUNTER — HOSPITAL ENCOUNTER (OUTPATIENT)
Age: 34
Discharge: HOME | End: 2019-06-29
Attending: LEGAL MEDICINE | Admitting: LEGAL MEDICINE
Payer: COMMERCIAL

## 2019-06-29 VITALS
TEMPERATURE: 98.2 F | HEART RATE: 86 BPM | DIASTOLIC BLOOD PRESSURE: 76 MMHG | SYSTOLIC BLOOD PRESSURE: 135 MMHG | RESPIRATION RATE: 16 BRPM

## 2019-06-29 LAB
ALBUMIN SERPL-MCNC: 3.3 G/DL (ref 3.5–5.2)
ALP BLD-CCNC: 143 U/L (ref 35–104)
ALT SERPL-CCNC: 7 U/L (ref 0–32)
ANION GAP SERPL CALCULATED.3IONS-SCNC: 14 MMOL/L (ref 7–16)
AST SERPL-CCNC: 17 U/L (ref 0–31)
BACTERIA: ABNORMAL /HPF
BASOPHILS ABSOLUTE: 0.02 E9/L (ref 0–0.2)
BASOPHILS RELATIVE PERCENT: 0.2 % (ref 0–2)
BILIRUB SERPL-MCNC: <0.2 MG/DL (ref 0–1.2)
BILIRUBIN URINE: NEGATIVE
BLOOD, URINE: ABNORMAL
BUN BLDV-MCNC: 10 MG/DL (ref 6–20)
CALCIUM SERPL-MCNC: 8.9 MG/DL (ref 8.6–10.2)
CHLORIDE BLD-SCNC: 104 MMOL/L (ref 98–107)
CLARITY: ABNORMAL
CO2: 20 MMOL/L (ref 22–29)
COLOR: YELLOW
CREAT SERPL-MCNC: 0.5 MG/DL (ref 0.5–1)
EOSINOPHILS ABSOLUTE: 0.05 E9/L (ref 0.05–0.5)
EOSINOPHILS RELATIVE PERCENT: 0.5 % (ref 0–6)
EPITHELIAL CELLS, UA: ABNORMAL /HPF
GFR AFRICAN AMERICAN: >60
GFR NON-AFRICAN AMERICAN: >60 ML/MIN/1.73
GLUCOSE BLD-MCNC: 171 MG/DL (ref 74–99)
GLUCOSE URINE: NEGATIVE MG/DL
HCT VFR BLD CALC: 36.4 % (ref 34–48)
HEMOGLOBIN: 11.7 G/DL (ref 11.5–15.5)
IMMATURE GRANULOCYTES #: 0.06 E9/L
IMMATURE GRANULOCYTES %: 0.6 % (ref 0–5)
KETONES, URINE: NEGATIVE MG/DL
LEUKOCYTE ESTERASE, URINE: ABNORMAL
LYMPHOCYTES ABSOLUTE: 2.25 E9/L (ref 1.5–4)
LYMPHOCYTES RELATIVE PERCENT: 22.9 % (ref 20–42)
MCH RBC QN AUTO: 24.8 PG (ref 26–35)
MCHC RBC AUTO-ENTMCNC: 32.1 % (ref 32–34.5)
MCV RBC AUTO: 77.3 FL (ref 80–99.9)
MONOCYTES ABSOLUTE: 0.54 E9/L (ref 0.1–0.95)
MONOCYTES RELATIVE PERCENT: 5.5 % (ref 2–12)
NEUTROPHILS ABSOLUTE: 6.92 E9/L (ref 1.8–7.3)
NEUTROPHILS RELATIVE PERCENT: 70.3 % (ref 43–80)
NITRITE, URINE: NEGATIVE
PDW BLD-RTO: 15.1 FL (ref 11.5–15)
PH UA: 6.5 (ref 5–9)
PLATELET # BLD: 249 E9/L (ref 130–450)
PMV BLD AUTO: 10.2 FL (ref 7–12)
POTASSIUM SERPL-SCNC: 3.4 MMOL/L (ref 3.5–5)
PROTEIN UA: 30 MG/DL
RBC # BLD: 4.71 E12/L (ref 3.5–5.5)
RBC UA: ABNORMAL /HPF (ref 0–2)
SODIUM BLD-SCNC: 138 MMOL/L (ref 132–146)
SPECIFIC GRAVITY UA: 1.02 (ref 1–1.03)
TOTAL PROTEIN: 6.7 G/DL (ref 6.4–8.3)
UROBILINOGEN, URINE: 0.2 E.U./DL
WBC # BLD: 9.8 E9/L (ref 4.5–11.5)
WBC UA: ABNORMAL /HPF (ref 0–5)

## 2019-06-29 PROCEDURE — 81001 URINALYSIS AUTO W/SCOPE: CPT

## 2019-06-29 PROCEDURE — 99201 HC NEW PT, OUTPT VISIT LEVEL 1: CPT

## 2019-06-29 PROCEDURE — 80053 COMPREHEN METABOLIC PANEL: CPT

## 2019-06-29 PROCEDURE — 85025 COMPLETE CBC W/AUTO DIFF WBC: CPT

## 2019-06-29 PROCEDURE — 36415 COLL VENOUS BLD VENIPUNCTURE: CPT

## 2019-06-29 NOTE — PROGRESS NOTES
Dr. Lukas Broussard notified of lab results and pt status. Dwight aBrahona for discharge. Strip faxed to office.

## 2019-06-29 NOTE — PROGRESS NOTES
36w2d pt arrived for scheduled NST for GDM and possible PIH. EFM/[place] on. Denies LOF/bleeding/ctx. Perceives fetal movement. Call light in reach.

## 2019-07-03 ENCOUNTER — ROUTINE PRENATAL (OUTPATIENT)
Dept: OBGYN CLINIC | Age: 34
End: 2019-07-03
Payer: COMMERCIAL

## 2019-07-03 VITALS
WEIGHT: 226 LBS | DIASTOLIC BLOOD PRESSURE: 96 MMHG | BODY MASS INDEX: 42.67 KG/M2 | SYSTOLIC BLOOD PRESSURE: 146 MMHG | HEIGHT: 61 IN | HEART RATE: 97 BPM

## 2019-07-03 DIAGNOSIS — Z86.32 HISTORY OF GESTATIONAL DIABETES IN PRIOR PREGNANCY, CURRENTLY PREGNANT: ICD-10-CM

## 2019-07-03 DIAGNOSIS — D25.0 INTRAMURAL AND SUBMUCOUS LEIOMYOMA OF UTERUS: ICD-10-CM

## 2019-07-03 DIAGNOSIS — D25.1 INTRAMURAL AND SUBMUCOUS LEIOMYOMA OF UTERUS: ICD-10-CM

## 2019-07-03 DIAGNOSIS — O09.299 HISTORY OF GESTATIONAL DIABETES IN PRIOR PREGNANCY, CURRENTLY PREGNANT: ICD-10-CM

## 2019-07-03 DIAGNOSIS — O34.219 PREVIOUS CESAREAN DELIVERY AFFECTING PREGNANCY, ANTEPARTUM: ICD-10-CM

## 2019-07-03 DIAGNOSIS — Z3A.36 36 WEEKS GESTATION OF PREGNANCY: Primary | ICD-10-CM

## 2019-07-03 DIAGNOSIS — O09.529 ANTEPARTUM MULTIGRAVIDA OF ADVANCED MATERNAL AGE: ICD-10-CM

## 2019-07-03 DIAGNOSIS — O24.414 INSULIN CONTROLLED GESTATIONAL DIABETES MELLITUS (GDM) IN THIRD TRIMESTER: ICD-10-CM

## 2019-07-03 LAB
GLUCOSE URINE, POC: NORMAL
PROTEIN UA: POSITIVE

## 2019-07-03 PROCEDURE — 99211 OFF/OP EST MAY X REQ PHY/QHP: CPT | Performed by: OBSTETRICS & GYNECOLOGY

## 2019-07-03 PROCEDURE — 81002 URINALYSIS NONAUTO W/O SCOPE: CPT | Performed by: OBSTETRICS & GYNECOLOGY

## 2019-07-03 PROCEDURE — 99213 OFFICE O/P EST LOW 20 MIN: CPT | Performed by: OBSTETRICS & GYNECOLOGY

## 2019-07-03 PROCEDURE — 76819 FETAL BIOPHYS PROFIL W/O NST: CPT | Performed by: OBSTETRICS & GYNECOLOGY

## 2019-07-03 NOTE — PATIENT INSTRUCTIONS
Patient Education        Learning About When to Call Your Doctor During Pregnancy (After 20 Weeks)  Your Care Instructions  It's common to have concerns about what might be a problem during pregnancy. Although most pregnant women don't have any serious problems, it's important to know when to call your doctor if you have certain symptoms or signs of labor. These are general suggestions. Your doctor may give you some more information about when to call. When to call your doctor (after 20 weeks)  Call 911 anytime you think you may need emergency care. For example, call if:  · You have severe vaginal bleeding. · You have sudden, severe pain in your belly. · You passed out (lost consciousness). · You have a seizure. · You see or feel the umbilical cord. · You think you are about to deliver your baby and can't make it safely to the hospital.  Call your doctor now or seek immediate medical care if:  · You have vaginal bleeding. · You have belly pain. · You have a fever. · You have symptoms of preeclampsia, such as:  ? Sudden swelling of your face, hands, or feet. ? New vision problems (such as dimness or blurring). ? A severe headache. · You have a sudden release of fluid from your vagina. (You think your water broke.)  · You think that you may be in labor. This means that you've had at least 4 contractions within 20 minutes or at least 8 contractions in an hour. · You notice that your baby has stopped moving or is moving much less than normal.  · You have symptoms of a urinary tract infection. These may include:  ? Pain or burning when you urinate. ? A frequent need to urinate without being able to pass much urine. ? Pain in the flank, which is just below the rib cage and above the waist on either side of the back. ? Blood in your urine. Watch closely for changes in your health, and be sure to contact your doctor if:  · You have vaginal discharge that smells bad.   · You have skin changes, such Instructions    You are near the end of your pregnancy--and you're probably pretty uncomfortable. It may be harder to walk around. Lying down probably isn't comfortable either. You may have trouble getting to sleep or staying asleep. Most women deliver their babies between 40 and 41 weeks. This is a good time to think about packing a bag for the hospital with items you'll need. Then you'll be ready when labor starts. Follow-up care is a key part of your treatment and safety. Be sure to make and go to all appointments, and call your doctor if you are having problems. It's also a good idea to know your test results and keep a list of the medicines you take. How can you care for yourself at home? Learn about breastfeeding  · Breastfeeding is best for your baby and good for you. · Breast milk has antibodies to help your baby fight infections. · Mothers who breastfeed often lose weight faster, because making milk burns calories. · Learning the best ways to hold your baby will make breastfeeding easier. · Let your partner bathe and diaper the baby to keep your partner from feeling left out. Snuggle together when you breastfeed. · You may want to learn how to use a breast pump and store your milk. · If you choose to bottle feed, make the feeding feel like breastfeeding so you can bond with your baby. Always hold your baby and the bottle. Do not prop bottles or let your baby fall asleep with a bottle. Learn about crying  · It is common for babies to cry for 1 to 3 hours a day. Some cry more, some cry less. · Babies don't cry to make you upset or because you are a bad parent. · Crying is how your baby communicates. Your baby may be hungry; have gas; need a diaper change; or feel cold, warm, tired, lonely, or tense. Sometimes babies cry for unknown reasons. · If you respond to your baby's needs, he or she will learn to trust you. · Try to stay calm when your baby cries.  Your baby may get more upset if he or she

## 2019-07-03 NOTE — PROGRESS NOTES
[facility] Fetal Medicine  [address], [place]  Office [phone]  Fax [phone]    7/3/2019    RE:  Rusty Ruby     : 1985   AGE: 26 y.o. Dear Dr. Gi Ly,    Thank you for allowing me to provide prenatal consultation for Rusty Ruby. As I'm sure you will recall, Rusty Ruby is a 26 y.o. -0-0-1 No LMP recorded. Patient is pregnant. Estimated Date of Delivery: 19 at 36 weeks seen in our office today for the following: Follow-up for amniotic fluid volume, umbilical artery Doppler S/D ratio, and gestational diabetes. REASON FOR CONSULTATION:  Patient Active Problem List    Diagnosis Date Noted    36 weeks gestation of pregnancy 2019    Insulin controlled gestational diabetes mellitus (GDM) in third trimester 2019    Previous  delivery affecting pregnancy, antepartum 2019    History of gestational diabetes in prior pregnancy, currently pregnant 2019    Antepartum multigravida of advanced maternal age 2019    Encounter for procreative genetic counseling 2019    Uterine leiomyoma 2016        PAST HISTORY:  OB History    Para Term  AB Living   2 1 1     1   SAB TAB Ectopic Molar Multiple Live Births             1      # Outcome Date GA Lbr Roberto/2nd Weight Sex Delivery Anes PTL Lv   2 Current            1 Term 17 39w4d  6 lb 15 oz (3.147 kg) M CS-LTranv EPI  CAR    GYNECOLOGICAL:  Last pap smear: Normal  Abnormal pap smears requiring surgical treatment, Yes, LEEP procedure. .  Sexually transmitted diseases: negative  negative for HPV, and Negative for other sexually transmitted diseases.     MEDICAL:  Past Medical History:   Diagnosis Date    Abnormal Pap smear of cervix     leep procedure     Allergic     Complication of anesthesia     nausea    Diabetes mellitus ([address]     gestational Diabetes with first pregnancy     SURGICAL:   Positive for previous ,  knee surgery X2, neck Metabolic Disorder Yes, gestational diabetes with her last pregnancy     Will Iyer was seen in our office today. She has no complaints today but is here for a follow-up for amniotic fluid volume, umbilical artery Doppler S/D ratio, and gestational diabetes. She reports good fetal movement and no bleeding or fluid leaking. This is the patient's 2nd pregnancy. Her 1st pregnancy was complicated by gestational diabetes. Insulin was administered 3 times a day for glucose control. She also had an 8 cm myoma in the anterior uterine wall which caused pain during her pregnancy. There was some concern about fetal growth and she was induced at 39 weeks gestation. She dilated to 6 cm and then made no further progress. A primary  section was performed for a living baby boy who weighed 6 lbs. 15 oz. In this pregnancy, she was recently diagnosed with gestational diabetes. Insulin was started and she is currently taking Humulin 20 units in the morning and 10 units at bedtime. She also takes metformin 500 mg with each meal.  She states that her fasting glucose this morning was 87 mg/dl and postprandial values range from 107 to 118 mg/dl. PHYSICAL EXAMINATION:  BP (!) 144/96   Pulse 97   Ht 5' 1\" (1.549 m)   Wt 226 lb (102.5 kg)   Body mass index is 42.7 kg/m². General Appearance: Healthy looking, alert, no acute distress. Eyes: No pallor, no icterus, no photophobia. Abdomen: Soft, non-tender. Extremities: mild pretibial pitting edema  Pelvic: deferred  Urine dipstick:  Results for POC orders placed in visit on 19   POCT urine qual dipstick protein   Result Value Ref Range    Protein, UA Positive (A) trace Negative   POCT urine qual dipstick glucose   Result Value Ref Range    Glucose, UA POC neg      An ultrasound evaluation was done in our office today. Please refer to the enclosed copy of the ultrasound report for further information.     Lab Work Review:  reviewed    IMPRESSION:  1) Single intrauterine gestation at 40 6/11 weeks by dates. Fetal biometry was approx. 10 days less than dates last week. 2) The fetal anatomy appears normal and the fetal lie is now VERTEX. 3) The amniotic fluid volume is low normal (RUBEN 7.6- previously 6.7 and 12.1) and the placenta is fundal, posterior. 4) Advanced maternal age- NIPT negative, XX.  11) Previous  section. 6) History of gestational diabetes with insulin dependent gestational diabetes now. 7) Uterine myoma- not identified today. 8) The umbilical artery Doppler S/D ratio is wnl.  9) Elevated blood pressure- possible gestational hypertension vs. pre-eclampsia. RECOMMENDATIONS:  Each of the recommendations were discussed with the patient:  1) Single intrauterine gestation at 40 6/11 weeks by dates. Fetal biometry was approx. 10 days less than dates last week. 2) Normal fetal anatomy and amniotic fluid volume is low normal (RUBEN 7.6- previously 6.7 and 12.1) and the placenta is fundal, posterior. I have asked her to continue to increase her oral intake of water. 3) VERTEX lie. 4) Advanced maternal age- NIPT negative, XX.  11) Previous  section. 6) Gestational diabetes requiring insulin therapy and Metformin as described above. FBS this am 87 mg/dl and postprandial glucose values up to 118 mg/dl. She did have one elevated glucose value of 171 mg/dl when she was having her NST last week. 7) Uterine myoma-small anterior wall measuring 1.5 cms. 8) The umbilical artery Doppler S/D ratio is wnl. 9) BP today 144/96. She has no history of hypertension, possible gestational hypertension or initial pre-eclampsia. All blood work was normal 4 days ago. I suggest close monitoring of her blood pressure in addition to her amniotic fluid volume.   10) I suggested the patient return for a follow-up assessment of the amniotic fluid volume and umbilical artery Doppler S/D ratio in 4-5 days unless there is a clinical reason for her to return prior to

## 2019-07-04 ENCOUNTER — APPOINTMENT (OUTPATIENT)
Dept: LABOR AND DELIVERY | Age: 34
End: 2019-07-04
Payer: COMMERCIAL

## 2019-07-04 ENCOUNTER — HOSPITAL ENCOUNTER (OUTPATIENT)
Age: 34
Discharge: HOME | End: 2019-07-04
Attending: LEGAL MEDICINE | Admitting: LEGAL MEDICINE
Payer: COMMERCIAL

## 2019-07-04 VITALS — RESPIRATION RATE: 16 BRPM | SYSTOLIC BLOOD PRESSURE: 131 MMHG | HEART RATE: 83 BPM | DIASTOLIC BLOOD PRESSURE: 76 MMHG

## 2019-07-04 PROCEDURE — 59025 FETAL NON-STRESS TEST: CPT

## 2019-07-09 ENCOUNTER — HOSPITAL ENCOUNTER (OUTPATIENT)
Age: 34
Discharge: HOME | End: 2019-07-09
Attending: LEGAL MEDICINE | Admitting: LEGAL MEDICINE
Payer: COMMERCIAL

## 2019-07-09 ENCOUNTER — APPOINTMENT (OUTPATIENT)
Dept: LABOR AND DELIVERY | Age: 34
End: 2019-07-09
Payer: COMMERCIAL

## 2019-07-09 VITALS
TEMPERATURE: 98.5 F | HEART RATE: 73 BPM | DIASTOLIC BLOOD PRESSURE: 73 MMHG | SYSTOLIC BLOOD PRESSURE: 133 MMHG | RESPIRATION RATE: 18 BRPM

## 2019-07-09 PROBLEM — O26.90 COMPLICATED PREGNANCY, UNSPECIFIED TRIMESTER: Status: ACTIVE | Noted: 2019-07-09

## 2019-07-09 LAB
BACTERIA: ABNORMAL /HPF
BILIRUBIN URINE: NEGATIVE
BLOOD, URINE: ABNORMAL
CLARITY: ABNORMAL
COLOR: YELLOW
EPITHELIAL CELLS, UA: ABNORMAL /HPF
GLUCOSE URINE: NEGATIVE MG/DL
KETONES, URINE: NEGATIVE MG/DL
LEUKOCYTE ESTERASE, URINE: ABNORMAL
NITRITE, URINE: NEGATIVE
PH UA: 7.5 (ref 5–9)
PROTEIN UA: ABNORMAL MG/DL
RBC UA: ABNORMAL /HPF (ref 0–2)
SPECIFIC GRAVITY UA: 1.01 (ref 1–1.03)
UROBILINOGEN, URINE: 0.2 E.U./DL
WBC UA: >20 /HPF (ref 0–5)

## 2019-07-09 PROCEDURE — 59025 FETAL NON-STRESS TEST: CPT

## 2019-07-09 PROCEDURE — 81001 URINALYSIS AUTO W/SCOPE: CPT

## 2019-07-09 NOTE — PROGRESS NOTES
37w5d  EDC 19 ambulatory to L&D for scheduled NST due to GDM and possible PIH. EFM applied. Maternal perception of fetal movement noted. Denies bleeding, leaking of fluid, or any other complaints. Call light in reach.

## 2019-07-09 NOTE — PROGRESS NOTES
Dr. Nabil Babcokc aware of urinalysis result but that micro is still pending. Patient may be discharged home.

## 2019-07-11 ENCOUNTER — HOSPITAL ENCOUNTER (OUTPATIENT)
Age: 34
Setting detail: OBSERVATION
Discharge: HOME | End: 2019-07-11
Attending: LEGAL MEDICINE | Admitting: LEGAL MEDICINE
Payer: COMMERCIAL

## 2019-07-11 ENCOUNTER — APPOINTMENT (OUTPATIENT)
Dept: LABOR AND DELIVERY | Age: 34
End: 2019-07-11
Payer: COMMERCIAL

## 2019-07-11 VITALS — SYSTOLIC BLOOD PRESSURE: 134 MMHG | DIASTOLIC BLOOD PRESSURE: 69 MMHG | HEART RATE: 71 BPM

## 2019-07-11 PROBLEM — Z34.90 TERM PREGNANCY: Status: ACTIVE | Noted: 2019-07-11

## 2019-07-11 LAB
ABO/RH: NORMAL
ALBUMIN SERPL-MCNC: 3.2 G/DL (ref 3.5–5.2)
ALP BLD-CCNC: 131 U/L (ref 35–104)
ALT SERPL-CCNC: 8 U/L (ref 0–32)
ANION GAP SERPL CALCULATED.3IONS-SCNC: 14 MMOL/L (ref 7–16)
ANTIBODY SCREEN: NORMAL
AST SERPL-CCNC: 24 U/L (ref 0–31)
BACTERIA: ABNORMAL /HPF
BASOPHILS ABSOLUTE: 0.03 E9/L (ref 0–0.2)
BASOPHILS RELATIVE PERCENT: 0.3 % (ref 0–2)
BILIRUB SERPL-MCNC: <0.2 MG/DL (ref 0–1.2)
BILIRUBIN URINE: NEGATIVE
BLOOD, URINE: ABNORMAL
BUN BLDV-MCNC: 9 MG/DL (ref 6–20)
CALCIUM SERPL-MCNC: 10.1 MG/DL (ref 8.6–10.2)
CHLORIDE BLD-SCNC: 101 MMOL/L (ref 98–107)
CLARITY: ABNORMAL
CO2: 22 MMOL/L (ref 22–29)
COLOR: YELLOW
CREAT SERPL-MCNC: 0.6 MG/DL (ref 0.5–1)
EOSINOPHILS ABSOLUTE: 0.04 E9/L (ref 0.05–0.5)
EOSINOPHILS RELATIVE PERCENT: 0.4 % (ref 0–6)
GFR AFRICAN AMERICAN: >60
GFR NON-AFRICAN AMERICAN: >60 ML/MIN/1.73
GLUCOSE BLD-MCNC: 139 MG/DL (ref 74–99)
GLUCOSE URINE: NEGATIVE MG/DL
HCT VFR BLD CALC: 37.3 % (ref 34–48)
HEMOGLOBIN: 11.5 G/DL (ref 11.5–15.5)
IMMATURE GRANULOCYTES #: 0.05 E9/L
IMMATURE GRANULOCYTES %: 0.5 % (ref 0–5)
KETONES, URINE: NEGATIVE MG/DL
LEUKOCYTE ESTERASE, URINE: ABNORMAL
LYMPHOCYTES ABSOLUTE: 2.56 E9/L (ref 1.5–4)
LYMPHOCYTES RELATIVE PERCENT: 25.1 % (ref 20–42)
MCH RBC QN AUTO: 24.7 PG (ref 26–35)
MCHC RBC AUTO-ENTMCNC: 30.8 % (ref 32–34.5)
MCV RBC AUTO: 80.2 FL (ref 80–99.9)
MONOCYTES ABSOLUTE: 0.65 E9/L (ref 0.1–0.95)
MONOCYTES RELATIVE PERCENT: 6.4 % (ref 2–12)
NEUTROPHILS ABSOLUTE: 6.86 E9/L (ref 1.8–7.3)
NEUTROPHILS RELATIVE PERCENT: 67.3 % (ref 43–80)
NITRITE, URINE: NEGATIVE
PDW BLD-RTO: 15.9 FL (ref 11.5–15)
PH UA: 7 (ref 5–9)
PLATELET # BLD: 250 E9/L (ref 130–450)
PMV BLD AUTO: 10.6 FL (ref 7–12)
POTASSIUM SERPL-SCNC: 3.4 MMOL/L (ref 3.5–5)
PROTEIN UA: 30 MG/DL
RBC # BLD: 4.65 E12/L (ref 3.5–5.5)
RBC UA: ABNORMAL /HPF (ref 0–2)
SODIUM BLD-SCNC: 137 MMOL/L (ref 132–146)
SPECIFIC GRAVITY UA: 1.02 (ref 1–1.03)
TOTAL PROTEIN: 6.5 G/DL (ref 6.4–8.3)
UROBILINOGEN, URINE: 0.2 E.U./DL
WBC # BLD: 10.2 E9/L (ref 4.5–11.5)
WBC UA: ABNORMAL /HPF (ref 0–5)

## 2019-07-11 PROCEDURE — 86850 RBC ANTIBODY SCREEN: CPT

## 2019-07-11 PROCEDURE — 85025 COMPLETE CBC W/AUTO DIFF WBC: CPT

## 2019-07-11 PROCEDURE — 59025 FETAL NON-STRESS TEST: CPT

## 2019-07-11 PROCEDURE — 81001 URINALYSIS AUTO W/SCOPE: CPT

## 2019-07-11 PROCEDURE — G0378 HOSPITAL OBSERVATION PER HR: HCPCS

## 2019-07-11 PROCEDURE — 86901 BLOOD TYPING SEROLOGIC RH(D): CPT

## 2019-07-11 PROCEDURE — 80053 COMPREHEN METABOLIC PANEL: CPT

## 2019-07-11 PROCEDURE — 36415 COLL VENOUS BLD VENIPUNCTURE: CPT

## 2019-07-11 PROCEDURE — 86900 BLOOD TYPING SEROLOGIC ABO: CPT

## 2019-07-11 NOTE — PROGRESS NOTES
Patient ok to be discharged home. Dr Darrius Davis discussed plan of care with patient.  Patient discharged in stable condition Statement Selected

## 2019-07-12 ENCOUNTER — HOSPITAL ENCOUNTER (INPATIENT)
Age: 34
LOS: 2 days | Discharge: HOME | End: 2019-07-14
Attending: LEGAL MEDICINE | Admitting: LEGAL MEDICINE
Payer: COMMERCIAL

## 2019-07-12 ENCOUNTER — ANESTHESIA EVENT (OUTPATIENT)
Dept: LABOR AND DELIVERY | Age: 34
End: 2019-07-12
Payer: COMMERCIAL

## 2019-07-12 ENCOUNTER — ANESTHESIA (OUTPATIENT)
Dept: LABOR AND DELIVERY | Age: 34
End: 2019-07-12
Payer: COMMERCIAL

## 2019-07-12 VITALS — SYSTOLIC BLOOD PRESSURE: 117 MMHG | DIASTOLIC BLOOD PRESSURE: 79 MMHG | OXYGEN SATURATION: 94 %

## 2019-07-12 PROBLEM — Z3A.38 38 WEEKS GESTATION OF PREGNANCY: Status: ACTIVE | Noted: 2019-07-12

## 2019-07-12 LAB
ABO/RH: NORMAL
AMPHETAMINE SCREEN, URINE: NOT DETECTED
ANION GAP SERPL CALCULATED.3IONS-SCNC: 15 MMOL/L (ref 7–16)
ANTIBODY SCREEN: NORMAL
BARBITURATE SCREEN URINE: NOT DETECTED
BASOPHILS ABSOLUTE: 0.04 E9/L (ref 0–0.2)
BASOPHILS RELATIVE PERCENT: 0.4 % (ref 0–2)
BENZODIAZEPINE SCREEN, URINE: NOT DETECTED
BUN BLDV-MCNC: 10 MG/DL (ref 6–20)
CALCIUM SERPL-MCNC: 8.5 MG/DL (ref 8.6–10.2)
CANNABINOID SCREEN URINE: NOT DETECTED
CHLORIDE BLD-SCNC: 102 MMOL/L (ref 98–107)
CO2: 20 MMOL/L (ref 22–29)
COCAINE METABOLITE SCREEN URINE: NOT DETECTED
CREAT SERPL-MCNC: 0.6 MG/DL (ref 0.5–1)
EOSINOPHILS ABSOLUTE: 0.07 E9/L (ref 0.05–0.5)
EOSINOPHILS RELATIVE PERCENT: 0.6 % (ref 0–6)
GFR AFRICAN AMERICAN: >60
GFR NON-AFRICAN AMERICAN: >60 ML/MIN/1.73
GLUCOSE BLD-MCNC: 96 MG/DL (ref 74–99)
HCT VFR BLD CALC: 36.3 % (ref 34–48)
HEMOGLOBIN: 11.2 G/DL (ref 11.5–15.5)
IMMATURE GRANULOCYTES #: 0.07 E9/L
IMMATURE GRANULOCYTES %: 0.6 % (ref 0–5)
LYMPHOCYTES ABSOLUTE: 2.98 E9/L (ref 1.5–4)
LYMPHOCYTES RELATIVE PERCENT: 26.6 % (ref 20–42)
MCH RBC QN AUTO: 24.6 PG (ref 26–35)
MCHC RBC AUTO-ENTMCNC: 30.9 % (ref 32–34.5)
MCV RBC AUTO: 79.6 FL (ref 80–99.9)
METER GLUCOSE: 106 MG/DL (ref 74–99)
METER GLUCOSE: 79 MG/DL (ref 74–99)
METER GLUCOSE: 85 MG/DL (ref 74–99)
METHADONE SCREEN, URINE: NOT DETECTED
MONOCYTES ABSOLUTE: 0.78 E9/L (ref 0.1–0.95)
MONOCYTES RELATIVE PERCENT: 7 % (ref 2–12)
NEUTROPHILS ABSOLUTE: 7.25 E9/L (ref 1.8–7.3)
NEUTROPHILS RELATIVE PERCENT: 64.8 % (ref 43–80)
OPIATE SCREEN URINE: NOT DETECTED
PDW BLD-RTO: 15.9 FL (ref 11.5–15)
PHENCYCLIDINE SCREEN URINE: NOT DETECTED
PLATELET # BLD: 232 E9/L (ref 130–450)
PMV BLD AUTO: 10.4 FL (ref 7–12)
POTASSIUM SERPL-SCNC: 3.6 MMOL/L (ref 3.5–5)
PROPOXYPHENE SCREEN: NOT DETECTED
RBC # BLD: 4.56 E12/L (ref 3.5–5.5)
SODIUM BLD-SCNC: 137 MMOL/L (ref 132–146)
WBC # BLD: 11.2 E9/L (ref 4.5–11.5)

## 2019-07-12 PROCEDURE — 86901 BLOOD TYPING SEROLOGIC RH(D): CPT

## 2019-07-12 PROCEDURE — 2500000003 HC RX 250 WO HCPCS: Performed by: REGISTERED NURSE

## 2019-07-12 PROCEDURE — 80048 BASIC METABOLIC PNL TOTAL CA: CPT

## 2019-07-12 PROCEDURE — 2709999900 HC NON-CHARGEABLE SUPPLY: Performed by: LEGAL MEDICINE

## 2019-07-12 PROCEDURE — 6360000002 HC RX W HCPCS: Performed by: REGISTERED NURSE

## 2019-07-12 PROCEDURE — 6360000002 HC RX W HCPCS: Performed by: LEGAL MEDICINE

## 2019-07-12 PROCEDURE — 3609079900 HC CESAREAN SECTION: Performed by: LEGAL MEDICINE

## 2019-07-12 PROCEDURE — 3700000001 HC ADD 15 MINUTES (ANESTHESIA): Performed by: LEGAL MEDICINE

## 2019-07-12 PROCEDURE — 7100000001 HC PACU RECOVERY - ADDTL 15 MIN: Performed by: LEGAL MEDICINE

## 2019-07-12 PROCEDURE — 6360000002 HC RX W HCPCS: Performed by: ANESTHESIOLOGY

## 2019-07-12 PROCEDURE — 82962 GLUCOSE BLOOD TEST: CPT

## 2019-07-12 PROCEDURE — 80307 DRUG TEST PRSMV CHEM ANLYZR: CPT

## 2019-07-12 PROCEDURE — 3700000000 HC ANESTHESIA ATTENDED CARE: Performed by: LEGAL MEDICINE

## 2019-07-12 PROCEDURE — 86850 RBC ANTIBODY SCREEN: CPT

## 2019-07-12 PROCEDURE — 7100000000 HC PACU RECOVERY - FIRST 15 MIN: Performed by: LEGAL MEDICINE

## 2019-07-12 PROCEDURE — 36415 COLL VENOUS BLD VENIPUNCTURE: CPT

## 2019-07-12 PROCEDURE — 2580000003 HC RX 258: Performed by: REGISTERED NURSE

## 2019-07-12 PROCEDURE — 85025 COMPLETE CBC W/AUTO DIFF WBC: CPT

## 2019-07-12 PROCEDURE — 2580000003 HC RX 258: Performed by: LEGAL MEDICINE

## 2019-07-12 PROCEDURE — 88307 TISSUE EXAM BY PATHOLOGIST: CPT

## 2019-07-12 PROCEDURE — 6370000000 HC RX 637 (ALT 250 FOR IP): Performed by: LEGAL MEDICINE

## 2019-07-12 PROCEDURE — 86900 BLOOD TYPING SEROLOGIC ABO: CPT

## 2019-07-12 PROCEDURE — 1220000001 HC SEMI PRIVATE L&D R&B

## 2019-07-12 RX ORDER — BUPIVACAINE HYDROCHLORIDE 7.5 MG/ML
INJECTION, SOLUTION INTRASPINAL PRN
Status: DISCONTINUED | OUTPATIENT
Start: 2019-07-12 | End: 2019-07-12 | Stop reason: SDUPTHER

## 2019-07-12 RX ORDER — FERROUS SULFATE 325(65) MG
325 TABLET ORAL
Status: DISCONTINUED | OUTPATIENT
Start: 2019-07-12 | End: 2019-07-14 | Stop reason: HOSPADM

## 2019-07-12 RX ORDER — OXYCODONE HYDROCHLORIDE 5 MG/1
5 TABLET ORAL EVERY 4 HOURS PRN
Status: DISCONTINUED | OUTPATIENT
Start: 2019-07-13 | End: 2019-07-14 | Stop reason: HOSPADM

## 2019-07-12 RX ORDER — DEXTROSE AND SODIUM CHLORIDE 5; .9 G/100ML; G/100ML
INJECTION, SOLUTION INTRAVENOUS CONTINUOUS
Status: DISCONTINUED | OUTPATIENT
Start: 2019-07-12 | End: 2019-07-12

## 2019-07-12 RX ORDER — OXYCODONE HYDROCHLORIDE 5 MG/1
10 TABLET ORAL EVERY 4 HOURS PRN
Status: ACTIVE | OUTPATIENT
Start: 2019-07-12 | End: 2019-07-13

## 2019-07-12 RX ORDER — SODIUM CHLORIDE, SODIUM LACTATE, POTASSIUM CHLORIDE, CALCIUM CHLORIDE 600; 310; 30; 20 MG/100ML; MG/100ML; MG/100ML; MG/100ML
INJECTION, SOLUTION INTRAVENOUS CONTINUOUS
Status: DISCONTINUED | OUTPATIENT
Start: 2019-07-12 | End: 2019-07-12 | Stop reason: ALTCHOICE

## 2019-07-12 RX ORDER — MEPERIDINE HYDROCHLORIDE 25 MG/ML
12.5 INJECTION INTRAMUSCULAR; INTRAVENOUS; SUBCUTANEOUS EVERY 6 HOURS PRN
Status: ACTIVE | OUTPATIENT
Start: 2019-07-12 | End: 2019-07-13

## 2019-07-12 RX ORDER — DOCUSATE SODIUM 100 MG/1
100 CAPSULE, LIQUID FILLED ORAL 2 TIMES DAILY
Status: DISCONTINUED | OUTPATIENT
Start: 2019-07-12 | End: 2019-07-14 | Stop reason: HOSPADM

## 2019-07-12 RX ORDER — OXYCODONE HYDROCHLORIDE 5 MG/1
5 TABLET ORAL EVERY 4 HOURS PRN
Status: ACTIVE | OUTPATIENT
Start: 2019-07-12 | End: 2019-07-13

## 2019-07-12 RX ORDER — CARBOPROST TROMETHAMINE 250 UG/ML
INJECTION, SOLUTION INTRAMUSCULAR PRN
Status: DISCONTINUED | OUTPATIENT
Start: 2019-07-12 | End: 2019-07-12 | Stop reason: SDUPTHER

## 2019-07-12 RX ORDER — NALOXONE HYDROCHLORIDE 0.4 MG/ML
0.4 INJECTION, SOLUTION INTRAMUSCULAR; INTRAVENOUS; SUBCUTANEOUS PRN
Status: DISCONTINUED | OUTPATIENT
Start: 2019-07-12 | End: 2019-07-12 | Stop reason: SDUPTHER

## 2019-07-12 RX ORDER — SODIUM CHLORIDE, SODIUM LACTATE, POTASSIUM CHLORIDE, CALCIUM CHLORIDE 600; 310; 30; 20 MG/100ML; MG/100ML; MG/100ML; MG/100ML
INJECTION, SOLUTION INTRAVENOUS CONTINUOUS
Status: DISCONTINUED | OUTPATIENT
Start: 2019-07-12 | End: 2019-07-14 | Stop reason: HOSPADM

## 2019-07-12 RX ORDER — ACETAMINOPHEN 500 MG
1000 TABLET ORAL EVERY 6 HOURS
Status: DISCONTINUED | OUTPATIENT
Start: 2019-07-12 | End: 2019-07-14 | Stop reason: HOSPADM

## 2019-07-12 RX ORDER — NALBUPHINE HCL 10 MG/ML
5 AMPUL (ML) INJECTION EVERY 4 HOURS PRN
Status: DISCONTINUED | OUTPATIENT
Start: 2019-07-12 | End: 2019-07-14 | Stop reason: HOSPADM

## 2019-07-12 RX ORDER — SODIUM CHLORIDE 0.9 % (FLUSH) 0.9 %
10 SYRINGE (ML) INJECTION PRN
Status: DISCONTINUED | OUTPATIENT
Start: 2019-07-12 | End: 2019-07-12

## 2019-07-12 RX ORDER — MORPHINE SULFATE 1 MG/ML
INJECTION, SOLUTION EPIDURAL; INTRATHECAL; INTRAVENOUS PRN
Status: DISCONTINUED | OUTPATIENT
Start: 2019-07-12 | End: 2019-07-12 | Stop reason: SDUPTHER

## 2019-07-12 RX ORDER — CARBOPROST TROMETHAMINE 250 UG/ML
250 INJECTION, SOLUTION INTRAMUSCULAR PRN
Status: DISCONTINUED | OUTPATIENT
Start: 2019-07-12 | End: 2019-07-14 | Stop reason: HOSPADM

## 2019-07-12 RX ORDER — OXYCODONE HYDROCHLORIDE 5 MG/1
5 TABLET ORAL EVERY 4 HOURS PRN
Status: DISCONTINUED | OUTPATIENT
Start: 2019-07-12 | End: 2019-07-12 | Stop reason: SDUPTHER

## 2019-07-12 RX ORDER — LANOLIN 100 %
OINTMENT (GRAM) TOPICAL
Status: DISCONTINUED | OUTPATIENT
Start: 2019-07-12 | End: 2019-07-14 | Stop reason: HOSPADM

## 2019-07-12 RX ORDER — SODIUM CHLORIDE, SODIUM LACTATE, POTASSIUM CHLORIDE, AND CALCIUM CHLORIDE .6; .31; .03; .02 G/100ML; G/100ML; G/100ML; G/100ML
1000 INJECTION, SOLUTION INTRAVENOUS ONCE
Status: DISCONTINUED | OUTPATIENT
Start: 2019-07-12 | End: 2019-07-12 | Stop reason: ALTCHOICE

## 2019-07-12 RX ORDER — NALOXONE HYDROCHLORIDE 0.4 MG/ML
0.4 INJECTION, SOLUTION INTRAMUSCULAR; INTRAVENOUS; SUBCUTANEOUS PRN
Status: DISCONTINUED | OUTPATIENT
Start: 2019-07-12 | End: 2019-07-14 | Stop reason: HOSPADM

## 2019-07-12 RX ORDER — TRISODIUM CITRATE DIHYDRATE AND CITRIC ACID MONOHYDRATE 500; 334 MG/5ML; MG/5ML
SOLUTION ORAL
Status: DISCONTINUED
Start: 2019-07-12 | End: 2019-07-12 | Stop reason: WASHOUT

## 2019-07-12 RX ORDER — SODIUM CHLORIDE 0.9 % (FLUSH) 0.9 %
10 SYRINGE (ML) INJECTION EVERY 12 HOURS SCHEDULED
Status: DISCONTINUED | OUTPATIENT
Start: 2019-07-12 | End: 2019-07-14 | Stop reason: HOSPADM

## 2019-07-12 RX ORDER — METHYLERGONOVINE MALEATE 0.2 MG/ML
200 INJECTION INTRAVENOUS PRN
Status: DISCONTINUED | OUTPATIENT
Start: 2019-07-12 | End: 2019-07-14 | Stop reason: HOSPADM

## 2019-07-12 RX ORDER — ONDANSETRON 2 MG/ML
INJECTION INTRAMUSCULAR; INTRAVENOUS PRN
Status: DISCONTINUED | OUTPATIENT
Start: 2019-07-12 | End: 2019-07-12 | Stop reason: SDUPTHER

## 2019-07-12 RX ORDER — ONDANSETRON 2 MG/ML
4 INJECTION INTRAMUSCULAR; INTRAVENOUS EVERY 6 HOURS PRN
Status: DISCONTINUED | OUTPATIENT
Start: 2019-07-12 | End: 2019-07-14 | Stop reason: HOSPADM

## 2019-07-12 RX ORDER — METOCLOPRAMIDE 10 MG/1
10 TABLET ORAL
Status: DISCONTINUED | OUTPATIENT
Start: 2019-07-12 | End: 2019-07-14 | Stop reason: HOSPADM

## 2019-07-12 RX ORDER — SODIUM CHLORIDE 0.9 % (FLUSH) 0.9 %
10 SYRINGE (ML) INJECTION EVERY 12 HOURS SCHEDULED
Status: DISCONTINUED | OUTPATIENT
Start: 2019-07-12 | End: 2019-07-12

## 2019-07-12 RX ORDER — PRENATAL WITH FERROUS FUM AND FOLIC ACID 3080; 920; 120; 400; 22; 1.84; 3; 20; 10; 1; 12; 200; 27; 25; 2 [IU]/1; [IU]/1; MG/1; [IU]/1; MG/1; MG/1; MG/1; MG/1; MG/1; MG/1; UG/1; MG/1; MG/1; MG/1; MG/1
1 TABLET ORAL DAILY
Status: DISCONTINUED | OUTPATIENT
Start: 2019-07-12 | End: 2019-07-14 | Stop reason: HOSPADM

## 2019-07-12 RX ORDER — ACETAMINOPHEN 500 MG
1000 TABLET ORAL EVERY 6 HOURS PRN
Status: DISCONTINUED | OUTPATIENT
Start: 2019-07-12 | End: 2019-07-12 | Stop reason: SDUPTHER

## 2019-07-12 RX ORDER — KETOROLAC TROMETHAMINE 30 MG/ML
30 INJECTION, SOLUTION INTRAMUSCULAR; INTRAVENOUS EVERY 6 HOURS
Status: DISCONTINUED | OUTPATIENT
Start: 2019-07-12 | End: 2019-07-12 | Stop reason: ALTCHOICE

## 2019-07-12 RX ORDER — SODIUM CHLORIDE 0.9 % (FLUSH) 0.9 %
10 SYRINGE (ML) INJECTION PRN
Status: DISCONTINUED | OUTPATIENT
Start: 2019-07-12 | End: 2019-07-14 | Stop reason: HOSPADM

## 2019-07-12 RX ORDER — ONDANSETRON 2 MG/ML
4 INJECTION INTRAMUSCULAR; INTRAVENOUS EVERY 6 HOURS PRN
Status: DISCONTINUED | OUTPATIENT
Start: 2019-07-12 | End: 2019-07-12 | Stop reason: SDUPTHER

## 2019-07-12 RX ORDER — DIPHENHYDRAMINE HYDROCHLORIDE 50 MG/ML
25 INJECTION INTRAMUSCULAR; INTRAVENOUS EVERY 6 HOURS PRN
Status: DISCONTINUED | OUTPATIENT
Start: 2019-07-12 | End: 2019-07-14 | Stop reason: HOSPADM

## 2019-07-12 RX ORDER — OXYCODONE HYDROCHLORIDE 5 MG/1
10 TABLET ORAL EVERY 4 HOURS PRN
Status: DISCONTINUED | OUTPATIENT
Start: 2019-07-13 | End: 2019-07-14 | Stop reason: HOSPADM

## 2019-07-12 RX ORDER — SODIUM CHLORIDE, SODIUM LACTATE, POTASSIUM CHLORIDE, CALCIUM CHLORIDE 600; 310; 30; 20 MG/100ML; MG/100ML; MG/100ML; MG/100ML
INJECTION, SOLUTION INTRAVENOUS CONTINUOUS PRN
Status: DISCONTINUED | OUTPATIENT
Start: 2019-07-12 | End: 2019-07-12 | Stop reason: SDUPTHER

## 2019-07-12 RX ORDER — OXYCODONE HYDROCHLORIDE 5 MG/1
10 TABLET ORAL EVERY 4 HOURS PRN
Status: DISCONTINUED | OUTPATIENT
Start: 2019-07-12 | End: 2019-07-12 | Stop reason: SDUPTHER

## 2019-07-12 RX ORDER — TRISODIUM CITRATE DIHYDRATE AND CITRIC ACID MONOHYDRATE 500; 334 MG/5ML; MG/5ML
30 SOLUTION ORAL ONCE
Status: COMPLETED | OUTPATIENT
Start: 2019-07-12 | End: 2019-07-12

## 2019-07-12 RX ADMIN — SODIUM CHLORIDE, POTASSIUM CHLORIDE, SODIUM LACTATE AND CALCIUM CHLORIDE: 600; 310; 30; 20 INJECTION, SOLUTION INTRAVENOUS at 09:16

## 2019-07-12 RX ADMIN — ACETAMINOPHEN 1000 MG: 500 TABLET, FILM COATED ORAL at 22:56

## 2019-07-12 RX ADMIN — DIPHENHYDRAMINE HYDROCHLORIDE 25 MG: 50 INJECTION INTRAMUSCULAR; INTRAVENOUS at 13:51

## 2019-07-12 RX ADMIN — SODIUM CHLORIDE, POTASSIUM CHLORIDE, SODIUM LACTATE AND CALCIUM CHLORIDE 1000 ML: 600; 310; 30; 20 INJECTION, SOLUTION INTRAVENOUS at 07:43

## 2019-07-12 RX ADMIN — CARBOPROST TROMETHAMINE 250 MCG: 250 INJECTION, SOLUTION INTRAMUSCULAR at 09:30

## 2019-07-12 RX ADMIN — DOCUSATE SODIUM 100 MG: 100 CAPSULE, LIQUID FILLED ORAL at 22:13

## 2019-07-12 RX ADMIN — ONDANSETRON HYDROCHLORIDE 4 MG: 2 INJECTION, SOLUTION INTRAMUSCULAR; INTRAVENOUS at 09:32

## 2019-07-12 RX ADMIN — PHENYLEPHRINE HYDROCHLORIDE 100 MCG: 10 INJECTION INTRAVENOUS at 09:23

## 2019-07-12 RX ADMIN — BUPIVACAINE HYDROCHLORIDE IN DEXTROSE 1.6 ML: 7.5 INJECTION, SOLUTION SUBARACHNOID at 09:17

## 2019-07-12 RX ADMIN — PIPERACILLIN AND TAZOBACTAM 3.38 G: 3; .375 INJECTION, POWDER, FOR SOLUTION INTRAVENOUS at 08:41

## 2019-07-12 RX ADMIN — DEXTROSE AND SODIUM CHLORIDE: 5; 900 INJECTION, SOLUTION INTRAVENOUS at 08:40

## 2019-07-12 RX ADMIN — METOCLOPRAMIDE 10 MG: 10 TABLET ORAL at 22:13

## 2019-07-12 RX ADMIN — NALBUPHINE HYDROCHLORIDE 5 MG: 10 INJECTION, SOLUTION INTRAMUSCULAR; INTRAVENOUS; SUBCUTANEOUS at 11:52

## 2019-07-12 RX ADMIN — METOCLOPRAMIDE 10 MG: 10 TABLET ORAL at 13:47

## 2019-07-12 RX ADMIN — MORPHINE SULFATE 0.15 MG: 1 INJECTION, SOLUTION EPIDURAL; INTRATHECAL; INTRAVENOUS at 09:17

## 2019-07-12 RX ADMIN — ACETAMINOPHEN 1000 MG: 500 TABLET, FILM COATED ORAL at 10:52

## 2019-07-12 RX ADMIN — SODIUM CHLORIDE, POTASSIUM CHLORIDE, SODIUM LACTATE AND CALCIUM CHLORIDE: 600; 310; 30; 20 INJECTION, SOLUTION INTRAVENOUS at 10:40

## 2019-07-12 RX ADMIN — PHENYLEPHRINE HYDROCHLORIDE 100 MCG: 10 INJECTION INTRAVENOUS at 09:19

## 2019-07-12 RX ADMIN — ACETAMINOPHEN 1000 MG: 500 TABLET, FILM COATED ORAL at 15:48

## 2019-07-12 RX ADMIN — Medication 500 ML: at 09:30

## 2019-07-12 RX ADMIN — SODIUM CITRATE AND CITRIC ACID MONOHYDRATE 30 ML: 500; 334 SOLUTION ORAL at 08:27

## 2019-07-12 ASSESSMENT — PULMONARY FUNCTION TESTS
PIF_VALUE: 0
PIF_VALUE: 0
PIF_VALUE: 1
PIF_VALUE: 0
PIF_VALUE: 1
PIF_VALUE: 0
PIF_VALUE: 1
PIF_VALUE: 0
PIF_VALUE: 1
PIF_VALUE: 0
PIF_VALUE: 0
PIF_VALUE: 1
PIF_VALUE: 0
PIF_VALUE: 1
PIF_VALUE: 0
PIF_VALUE: 0
PIF_VALUE: 1
PIF_VALUE: 0
PIF_VALUE: 1
PIF_VALUE: 0
PIF_VALUE: 1
PIF_VALUE: 0

## 2019-07-12 ASSESSMENT — PAIN SCALES - GENERAL
PAINLEVEL_OUTOF10: 0
PAINLEVEL_OUTOF10: 4
PAINLEVEL_OUTOF10: 2
PAINLEVEL_OUTOF10: 0

## 2019-07-12 NOTE — ANESTHESIA PROCEDURE NOTES
Spinal Block    Patient location during procedure: OR  Reason for block: primary anesthetic  Staffing  Anesthesiologist: Payal Doran MD  Other anesthesia staff: Flakita Seals RN  Performed: other anesthesia staff   Preanesthetic Checklist  Completed: patient identified, site marked, surgical consent, pre-op evaluation, timeout performed, IV checked, risks and benefits discussed, monitors and equipment checked, anesthesia consent given, oxygen available and patient being monitored  Spinal Block  Patient position: sitting  Prep: ChloraPrep  Patient monitoring: continuous pulse ox and frequent blood pressure checks  Approach: midline  Location: L3/L4  Provider prep: mask and sterile gloves  Local infiltration: lidocaine  Dose: 0.2  Agent: bupivacaine  Adjuvant: duramorph  Dose: 1.6  Dose: 1.6  Needle  Needle type: Pencan   Needle gauge: 25 G  Needle length: 3.5 in  Needle insertion depth: 3 cm  Assessment  Swirl obtained: Yes  CSF: clear  Attempts: 1  Hemodynamics: stable

## 2019-07-12 NOTE — H&P
had  an 18-pound weight gain with the pregnancy. HEENT:  Negative. LUNGS:  Clear to auscultation. CV:  Regular rate and rhythm. ABDOMEN:  Gravid, soft, and nontender. Estimated fetal weight is 2800  gm. Fetal heart tones are present in the 140s. EXTREMITIES:  No clubbing, cyanosis. 2+ edema. NEUROLOGIC:  CN II through XII are grossly intact. She is alert and  oriented x4. ASSESSMENT:  Intrauterine pregnancy at 38 weeks and 1 day with  preeclampsia with mild features. Previous . A2 diabetes in  fair control. Fibroid uterus. PLAN:  Plan is for repeat . Risks of the procedure have been  reviewed, including but not limited to infection, bleeding, injury to  bowel, bladder, ureter, major vessels, fetal death, fetal damage, need  for respiratory assistance of the . She has been notified in the  event of excessive hemorrhage,  she may require a blood transfusion, and  the risks of blood transfusion have been reviewed, as including but not  limited to fever, transfusion reaction, anemia, hepatitis, AIDS, or  other infections. All her questions have been answered and she wishes  to proceed with surgery.         Abhi Casey MD    D: 2019 17:16:42       T: 2019 19:07:48     [id number]  Job#: [id number]     Doc#: [id number]    CC:

## 2019-07-13 LAB
HEMOGLOBIN: 10.2 G/DL (ref 11.5–15.5)
METER GLUCOSE: 106 MG/DL (ref 74–99)
METER GLUCOSE: 137 MG/DL (ref 74–99)
METER GLUCOSE: 151 MG/DL (ref 74–99)
METER GLUCOSE: 89 MG/DL (ref 74–99)

## 2019-07-13 PROCEDURE — 82962 GLUCOSE BLOOD TEST: CPT

## 2019-07-13 PROCEDURE — 1220000001 HC SEMI PRIVATE L&D R&B

## 2019-07-13 PROCEDURE — 6370000000 HC RX 637 (ALT 250 FOR IP): Performed by: LEGAL MEDICINE

## 2019-07-13 PROCEDURE — 36415 COLL VENOUS BLD VENIPUNCTURE: CPT

## 2019-07-13 PROCEDURE — 2580000003 HC RX 258: Performed by: LEGAL MEDICINE

## 2019-07-13 PROCEDURE — 85018 HEMOGLOBIN: CPT

## 2019-07-13 RX ORDER — AMPICILLIN 1 G/1
INJECTION, POWDER, FOR SOLUTION INTRAMUSCULAR; INTRAVENOUS
Status: DISPENSED
Start: 2019-07-13 | End: 2019-07-13

## 2019-07-13 RX ORDER — SODIUM CHLORIDE 9 MG/ML
INJECTION, SOLUTION INTRAVENOUS
Status: DISPENSED
Start: 2019-07-13 | End: 2019-07-13

## 2019-07-13 RX ADMIN — DOCUSATE SODIUM 100 MG: 100 CAPSULE, LIQUID FILLED ORAL at 08:58

## 2019-07-13 RX ADMIN — ACETAMINOPHEN 1000 MG: 500 TABLET, FILM COATED ORAL at 05:59

## 2019-07-13 RX ADMIN — OXYCODONE HYDROCHLORIDE 5 MG: 5 TABLET ORAL at 15:33

## 2019-07-13 RX ADMIN — OXYCODONE HYDROCHLORIDE 5 MG: 5 TABLET ORAL at 19:51

## 2019-07-13 RX ADMIN — METFORMIN HYDROCHLORIDE 500 MG: 500 TABLET, FILM COATED ORAL at 08:58

## 2019-07-13 RX ADMIN — METFORMIN HYDROCHLORIDE 500 MG: 500 TABLET, FILM COATED ORAL at 17:49

## 2019-07-13 RX ADMIN — ACETAMINOPHEN 1000 MG: 500 TABLET, FILM COATED ORAL at 11:00

## 2019-07-13 RX ADMIN — METOCLOPRAMIDE 10 MG: 10 TABLET ORAL at 11:04

## 2019-07-13 RX ADMIN — Medication 10 ML: at 08:58

## 2019-07-13 RX ADMIN — METOCLOPRAMIDE 10 MG: 10 TABLET ORAL at 20:56

## 2019-07-13 RX ADMIN — METOCLOPRAMIDE 10 MG: 10 TABLET ORAL at 08:58

## 2019-07-13 RX ADMIN — METFORMIN HYDROCHLORIDE 500 MG: 500 TABLET, FILM COATED ORAL at 11:04

## 2019-07-13 RX ADMIN — ACETAMINOPHEN 1000 MG: 500 TABLET, FILM COATED ORAL at 17:27

## 2019-07-13 RX ADMIN — ACETAMINOPHEN 1000 MG: 500 TABLET, FILM COATED ORAL at 23:20

## 2019-07-13 RX ADMIN — VITAMIN A, VITAMIN C, VITAMIN D-3, VITAMIN E, VITAMIN B-1, VITAMIN B-2, NIACIN, VITAMIN B-6, CALCIUM, IRON, ZINC, COPPER 1 TABLET: 4000; 120; 400; 22; 1.84; 3; 20; 10; 1; 12; 200; 27; 25; 2 TABLET ORAL at 08:58

## 2019-07-13 RX ADMIN — METOCLOPRAMIDE 10 MG: 10 TABLET ORAL at 17:49

## 2019-07-13 RX ADMIN — DOCUSATE SODIUM 100 MG: 100 CAPSULE, LIQUID FILLED ORAL at 20:55

## 2019-07-13 ASSESSMENT — PAIN SCALES - GENERAL
PAINLEVEL_OUTOF10: 4
PAINLEVEL_OUTOF10: 6
PAINLEVEL_OUTOF10: 5
PAINLEVEL_OUTOF10: 4
PAINLEVEL_OUTOF10: 5
PAINLEVEL_OUTOF10: 6
PAINLEVEL_OUTOF10: 7

## 2019-07-13 ASSESSMENT — PAIN DESCRIPTION - PAIN TYPE: TYPE: SURGICAL PAIN

## 2019-07-13 ASSESSMENT — PAIN DESCRIPTION - DESCRIPTORS: DESCRIPTORS: ACHING

## 2019-07-13 ASSESSMENT — PAIN DESCRIPTION - LOCATION: LOCATION: ABDOMEN

## 2019-07-13 NOTE — PROGRESS NOTES
Pascual removed per order. Pericare completed. Instructed for pt to call for assistance with urge to void. Labs per order. Pt medicated for complaints of abdominal pain.

## 2019-07-13 NOTE — DISCHARGE SUMMARY
[address]                               DISCHARGE SUMMARY    PATIENT NAME: Lila Loco                      :        1985  MED REC NO:   [id number]                            ROOM:       0217  ACCOUNT NO:   [de-identified]                           ADMIT DATE: 2019. PROVIDER:     Iliana Barrett MD                  DISCHARGE DATE:      ADMITTING DIAGNOSES:  Intrauterine pregnancy at term with preeclampsia  with mild features, previous , declining , 38 weeks. DISCHARGE DIAGNOSES:  Intrauterine pregnancy at term with preeclampsia  with mild features, previous , declining , 38 weeks. PROCEDURE PERFORMED:  Repeat low transverse . HOSPITAL COURSE IN DETAIL:  The patient is doing well. Voiding well. Passing flatus. Minimal lochia. Pain is under adequate control. Admission labs reveal  a white count of 11.2, hemoglobin 11.2, platelets 625,367. ALT 10, AST  0.6. Glucose 79. Postpartum day-1 hemoglobin is 10.2. Accu-Cheks  ranged between 80 up to 151. She is afebrile. Heart rate 88-98,  blood pressure 126-117 over 58-76, O2 sat 97% on room air. Urine output  3067-9881 mL per shift. She is behind 2.3 L. On exam, her lungs are  clear to auscultation. CV:  Regular rate, rhythm. Abdomen:  Obese,  soft, nondistended, appropriately tender. No rebound or guarding. Normal bowel sounds are present. Fundus is firm and 2 fingerbreadths  below the umbilicus. Incision dry and intact. Perineum dry and intact. ASSESSMENT:  The patient is doing well, postop day-1, stable. Wishes to go home in the morning. PLAN:  Home in the morning once meets discharge criteria with fever,  bleeding, emboli, lifting, climbing, driving precautions. She is to  follow up at the office in 6 weeks. She indicates understanding of all  instructions.         Ban Kaiser,

## 2019-07-14 VITALS
HEART RATE: 95 BPM | TEMPERATURE: 98.9 F | SYSTOLIC BLOOD PRESSURE: 138 MMHG | HEIGHT: 61 IN | BODY MASS INDEX: 41.16 KG/M2 | DIASTOLIC BLOOD PRESSURE: 85 MMHG | RESPIRATION RATE: 16 BRPM | WEIGHT: 218 LBS | OXYGEN SATURATION: 96 %

## 2019-07-14 LAB
METER GLUCOSE: 111 MG/DL (ref 74–99)
METER GLUCOSE: 85 MG/DL (ref 74–99)
METER GLUCOSE: 97 MG/DL (ref 74–99)

## 2019-07-14 PROCEDURE — 6360000002 HC RX W HCPCS: Performed by: LEGAL MEDICINE

## 2019-07-14 PROCEDURE — 6370000000 HC RX 637 (ALT 250 FOR IP): Performed by: LEGAL MEDICINE

## 2019-07-14 PROCEDURE — 82962 GLUCOSE BLOOD TEST: CPT

## 2019-07-14 PROCEDURE — 90715 TDAP VACCINE 7 YRS/> IM: CPT | Performed by: LEGAL MEDICINE

## 2019-07-14 PROCEDURE — 90471 IMMUNIZATION ADMIN: CPT | Performed by: LEGAL MEDICINE

## 2019-07-14 RX ADMIN — TETANUS TOXOID, REDUCED DIPHTHERIA TOXOID AND ACELLULAR PERTUSSIS VACCINE, ADSORBED 0.5 ML: 5; 2.5; 8; 8; 2.5 SUSPENSION INTRAMUSCULAR at 16:01

## 2019-07-14 RX ADMIN — ACETAMINOPHEN 1000 MG: 500 TABLET, FILM COATED ORAL at 20:24

## 2019-07-14 RX ADMIN — METOCLOPRAMIDE 10 MG: 10 TABLET ORAL at 11:24

## 2019-07-14 RX ADMIN — METFORMIN HYDROCHLORIDE 500 MG: 500 TABLET, FILM COATED ORAL at 11:24

## 2019-07-14 RX ADMIN — METFORMIN HYDROCHLORIDE 500 MG: 500 TABLET, FILM COATED ORAL at 18:08

## 2019-07-14 RX ADMIN — METOCLOPRAMIDE 10 MG: 10 TABLET ORAL at 18:09

## 2019-07-14 RX ADMIN — OXYCODONE HYDROCHLORIDE 5 MG: 5 TABLET ORAL at 08:43

## 2019-07-14 RX ADMIN — ACETAMINOPHEN 1000 MG: 500 TABLET, FILM COATED ORAL at 06:36

## 2019-07-14 RX ADMIN — OXYCODONE HYDROCHLORIDE 5 MG: 5 TABLET ORAL at 15:59

## 2019-07-14 RX ADMIN — DOCUSATE SODIUM 100 MG: 100 CAPSULE, LIQUID FILLED ORAL at 08:43

## 2019-07-14 RX ADMIN — DOCUSATE SODIUM 100 MG: 100 CAPSULE, LIQUID FILLED ORAL at 20:24

## 2019-07-14 RX ADMIN — METFORMIN HYDROCHLORIDE 500 MG: 500 TABLET, FILM COATED ORAL at 08:43

## 2019-07-14 RX ADMIN — ACETAMINOPHEN 1000 MG: 500 TABLET, FILM COATED ORAL at 12:38

## 2019-07-14 RX ADMIN — VITAMIN A, VITAMIN C, VITAMIN D-3, VITAMIN E, VITAMIN B-1, VITAMIN B-2, NIACIN, VITAMIN B-6, CALCIUM, IRON, ZINC, COPPER 1 TABLET: 4000; 120; 400; 22; 1.84; 3; 20; 10; 1; 12; 200; 27; 25; 2 TABLET ORAL at 08:43

## 2019-07-14 RX ADMIN — METOCLOPRAMIDE 10 MG: 10 TABLET ORAL at 06:35

## 2019-07-14 ASSESSMENT — PAIN SCALES - GENERAL
PAINLEVEL_OUTOF10: 4
PAINLEVEL_OUTOF10: 4
PAINLEVEL_OUTOF10: 5
PAINLEVEL_OUTOF10: 5

## 2019-07-14 ASSESSMENT — PAIN DESCRIPTION - DESCRIPTORS: DESCRIPTORS: SORE

## 2019-07-14 NOTE — PLAN OF CARE
Problem: Mood - Altered:  Goal: Mood stable  Description  Mood stable  Outcome: Met This Shift     Problem: Pain - Acute:  Goal: Pain level will decrease  Description  Pain level will decrease  Outcome: Met This Shift   Patient is attentive to 's needs and bonding well with .  placed on back to sleep in [place] consistently this shift. Patient verbalized she is satisfied with pain control during this shift.

## 2019-07-14 NOTE — PLAN OF CARE
Problem: Infection - Surgical Site:  Goal: Will show no infection signs and symptoms  Description  Will show no infection signs and symptoms  7/14/2019 0938 by Tomas Wong RN  Outcome: Met This Shift  Note:   Vital signs obtained and within normal limits.

## 2021-03-19 ENCOUNTER — IMMUNIZATION (OUTPATIENT)
Dept: PRIMARY CARE CLINIC | Age: 36
End: 2021-03-19
Payer: COMMERCIAL

## 2021-03-19 PROCEDURE — 0001A COVID-19, PFIZER VACCINE 30MCG/0.3ML DOSE: CPT | Performed by: PHYSICIAN ASSISTANT

## 2021-03-19 PROCEDURE — 91300 COVID-19, PFIZER VACCINE 30MCG/0.3ML DOSE: CPT | Performed by: PHYSICIAN ASSISTANT

## 2021-04-14 ENCOUNTER — IMMUNIZATION (OUTPATIENT)
Dept: PRIMARY CARE CLINIC | Age: 36
End: 2021-04-14
Payer: COMMERCIAL

## 2021-04-14 PROCEDURE — 91300 COVID-19, PFIZER VACCINE 30MCG/0.3ML DOSE: CPT | Performed by: NURSE PRACTITIONER

## 2021-04-14 PROCEDURE — 0002A COVID-19, PFIZER VACCINE 30MCG/0.3ML DOSE: CPT | Performed by: NURSE PRACTITIONER

## 2023-03-07 ENCOUNTER — HOSPITAL ENCOUNTER (OUTPATIENT)
Dept: DIABETES SERVICES | Age: 38
Setting detail: THERAPIES SERIES
Discharge: HOME | End: 2023-03-07
Payer: COMMERCIAL

## 2023-03-07 PROCEDURE — G0109 DIAB MANAGE TRN IND/GROUP: HCPCS

## 2023-03-07 SDOH — ECONOMIC STABILITY: FOOD INSECURITY: ADDITIONAL INFORMATION: NO

## 2023-03-07 NOTE — LETTER
[facility]- Diabetes Education Department Diabetes Education Letter    3/9/2023       Re:     Bhavana Mtz         :  1985  Dear Dr. Richard Corrigan: Thank you for referring your patient, Bhavana Mtz, to [facility] diabetes education. Bhavana Mtz has completed their personalized comprehensive education plan. The education plan included the following topics:          Diabetes disease process & treatment   Healthy Eating  Being Active  Taking Medication  Monitoring Glucose  Acute and Chronic Complications  Lifestyle and Healthy coping  Diabetes Distress and Support       In addition, the PAID -5 (Problem Areas in Diabetes) Survey was completed. The results From 3/9/2023 were 6  A total score of 8 or greater indicates possible diabetes related emotional distress which warrants further assessment.  [] [address] and Crisis Resource information provided. Bhavana Mtz selected behavioral goal is:    [x]Healthy Eating  []Monitoring  []Problem Solving  []Being Active  []Taking Medication  []Other: We will send a follow-up letter in 3 months to notify you of their progress. Thank you for referring this patient to our program.  Please do not hesitate to call if you have any questions at [phone] [facility] or [facility]) or [phone] ([facility]).         Sincerely,    [facility] Diabetes Education Department  American Diabetes Association Recognized DSMES Program rev. 10/19/22

## 2023-03-07 NOTE — PROGRESS NOTES
Diabetes Self-Management Education Record    Participant Name: Anita November  Referring Provider: Davis Augustin MD  Assessment/Evaluation Ratings:  1=Needs Instruction   4=Demonstrates Understanding/Competency  2=Needs Review   NC=Not Covered    3=Comprehends Key Points  N/A=Not Applicable  Topics/Learning Objectives Pre-session Assess Date:  Instructor initials/date   Instr. Date    Instructor initials/date Follow-up Post- session Eval Comments   Diabetes disease process & Treatment process:   -Define type of diabetes in simple terms.  - Describe the ABCs of  diabetes management  -Identify own type of diabetes  -Identify lifestyle changes/treatment options  -other:   [] All     []  []  []  []  []      Developing strategies for Healthy coping/psychosocial issues:    -Describe feelings about living with diabetes  -Identify coping strategies and sources of stress  -Identify support needed & support network available  -Complete PAID-5 Diabetes questionnaire  [] All     []  []  []    []   Date:  PAID-5 Score:  Confidence Score:            Prevention, detection & treatment of Chronic complications:    -Identify the prevention, detection and treatment for complications including immunizations, preventive eye, foot, dental and renal exams as indicated per the participant's duration of diabetes and health status.  -Define the natural course of diabetes and the relationship of blood glucose levels to long term complications of diabetes.    [] All     []            []      Prevention, detection & treatment of acute complications:    -State the causes,signs & symptoms of hyper & hypoglycemia, and prevention & treatment strategies.   -Describe sick day guidelines  DKA /indications for ketone testing &  when to call physician   [] All     []      []                  -Identify severe weather/situation crisis  & diabetes supplies management  []      Using medications safely:   -State effects of diabetes medicines on blood glucose levels;  -List diabetes medication taken, action & side effects  [] All     []  []      Insulin/Injectables/glucagon  -Name appropriate injection sites; proper storage; supplies needed;     []       Demonstrate proper technique  []      Monitoring blood glucose, interpreting and using results:   -Identify the purpose of testing   -Identify recommended & personal blood glucose targets & HgbA1C target levels  -State the Importance of logging blood glucose levels for pattern recognition;   -State benefits of reading/using pt generated health data  -Verbalize safe lancet disposal  [] All     []  []    []  []  []   .    -Demonstrate proper testing technique  []      Incorporating physical activity into lifestyle:   -State effect of exercise on blood glucose levels;   -State benefits of regular exercise;   -Define safety considerations/food choices if needed.  -Describe contraindications/maintenance of activity. [] All     []  []    []  []      Incorporating nutritional management into lifestyle:   -Describe effect of type, amount & timing of food on blood glucose  -Describe methods for preparing and planning   healthy meals  -Correctly read food labels for nutritional values  -Name 3 foods high in Carbohydrate  -Plan a sample 4 carbohydrate-controlled meal using Diabetes Plate Method  -Verbalized ability to measure and count carbohydrate gram servings using food labels and carbohydrate food list.    -Plan a carbohydrate-controlled meal based on individual needs/preferences from a Registered Dietitian.    [] All       []    []    []  []      []        []                      Developing strategies for problem solving to promote health/change behavior. -Identify 7 self-care behaviors; Personal health risk factors;  Benefits, challenges & strategies for behavioral change and set an individualized goal selection.        []      []Nutrition  []Monitoring  []Exercise  []Medication  []Other     Identified Barriers to learning/adherence to self management plan:    None  []  other    Instruction Method:  Lecture/Discussion, Handouts, and Return demonstration     Supporting Education Materials/Equipment Provided: Self-management manual and Nutritional Packet   [][place] materials       [] services     []Other:      Encounter Type Date Attended Start Time End Time Comments No Show Dates   Assessment 3/7/23 SE         Session 1 3/7/23 PC 1730 2000      Session 2        1:1 DSMES          In person Follow-up         Gestational Diabetes         DSMES #3        Meter Instrx        Insulin Instrx           Additional Comments: [] Pt seen individually due to Covid-19 Safety precautions and no group session available.         Date:   Follow-up goal attainment based on patients initial DSMES goal    Dr Notified by [] EMR []Fax        []Post class Hgb A1C  []Medication compliance   []Plate method/meal plan compliance   []Able to state the number of Carbohydrate servings eaten at B,L,D   []Testing blood glucose as prescribed by PCP   []Exercise Routine   []Other:   []Other:     []Patient lost to follow-up   Notified by []EMR []Fax

## 2023-03-08 ENCOUNTER — HOSPITAL ENCOUNTER (OUTPATIENT)
Dept: DIABETES SERVICES | Age: 38
Setting detail: THERAPIES SERIES
Discharge: HOME | End: 2023-03-08
Payer: COMMERCIAL

## 2023-03-08 PROCEDURE — G0109 DIAB MANAGE TRN IND/GROUP: HCPCS

## 2023-03-08 ASSESSMENT — PROBLEM AREAS IN DIABETES QUESTIONNAIRE (PAID)
FEELING SCARED WHEN YOU THINK ABOUT LIVING WITH DIABETES: 2
FEELING THAT DIABETES IS TAKING UP TOO MUCH OF YOUR MENTAL AND PHYSICAL ENERGY EVERY DAY: 0
WORRYING ABOUT THE FUTURE AND THE POSSIBILITY OF SERIOUS COMPLICATIONS: 2
PAID-5 TOTAL SCORE: 6
COPING WITH COMPLICATIONS OF DIABETES: 1
FEELING DEPRESSED WHEN YOU THINK ABOUT LIVING WITH DIABETES: 1

## 2023-03-08 NOTE — PROGRESS NOTES
Diabetes Self-Management Education Record    Participant Name: Ivana Muhammad  Referring Provider: Mejia Hernandez MD  Assessment/Evaluation Ratings:  1=Needs Instruction   4=Demonstrates Understanding/Competency  2=Needs Review   NC=Not Covered    3=Comprehends Key Points  N/A=Not Applicable  Topics/Learning Objectives Pre-session Assess Date:  Instructor initials/date  3/7/23 PC Instr. Date  3/7/23 PC  Instructor initials/date Follow-up Post- session Eval Comments   Diabetes disease process & Treatment process:   -Define type of diabetes in simple terms.  - Describe the ABCs of  diabetes management  -Identify own type of diabetes  -Identify lifestyle changes/treatment options  -other:  1 [x] All     []  []  []  []  []  3 3/7/23 PC  New Dx of Type 2 DM  No family hx  Hx of GDM 2017   Developing strategies for Healthy coping/psychosocial issues:    -Describe feelings about living with diabetes  -Identify coping strategies and sources of stress  -Identify support needed & support network available  -Complete PAID-5 Diabetes questionnaire 1  All   []  []  []  []    []  3 Date:3/7/23   PAID-5 Score:6  Confidence Score: 3           Prevention, detection & treatment of Chronic complications:    -Identify the prevention, detection and treatment for complications including immunizations, preventive eye, foot, dental and renal exams as indicated per the participant's duration of diabetes and health status.  -Define the natural course of diabetes and the relationship of blood glucose levels to long term complications of diabetes.   1 [x] All     []            []  3 3/7/23 PC  Denies  (Hypertension with pregnancy)   Prevention, detection & treatment of acute complications:    -State the causes,signs & symptoms of hyper & hypoglycemia, and prevention & treatment strategies.   -Describe sick day guidelines  DKA /indications for ketone testing &  when to call physician  1 [x] All     []      []    3   3/7/23 PC  No lows  Hyperglycemia but not over 300 mg/dl         -Identify severe weather/situation crisis  & diabetes supplies management  []      Using medications safely:   -State effects of diabetes medicines on blood glucose levels;  -List diabetes medication taken, action & side effects 1 [x] All     []  []  3 3/7/23 PC  No DM medications at this time     Insulin/Injectables/glucagon  -Name appropriate injection sites; proper storage; supplies needed;  NA   []       Demonstrate proper technique  []      Monitoring blood glucose, interpreting and using results:   -Identify the purpose of testing   -Identify recommended & personal blood glucose targets & HgbA1C target levels  -State the Importance of logging blood glucose levels for pattern recognition;   -State benefits of reading/using pt generated health data  -Verbalize safe lancet disposal 1 [x] All     []  []    []  []  []  3 . 3/7/23 PC  CGM with average 132 mg/dl (124 - 165)  2 hr PP average 163 mg/dl (140-230)  Stated A1C 7.4%   -Demonstrate proper testing technique  []      Incorporating physical activity into lifestyle:   -State effect of exercise on blood glucose levels;   -State benefits of regular exercise;   -Define safety considerations/food choices if needed.  -Describe contraindications/maintenance of activity.  1 [x] All     []  []    []  []  3 3/7/23 PC  Aerobics, walking, wt lifting  3 - 5 days a week  60 minute sessions   Incorporating nutritional management into lifestyle:   -Describe effect of type, amount & timing of food on blood glucose  -Describe methods for preparing and planning   healthy meals  -Correctly read food labels for nutritional values  -Name 3 foods high in Carbohydrate  -Plan a sample 4 carbohydrate-controlled meal using Diabetes Plate Method  -Verbalized ability to measure and count carbohydrate gram servings using food labels and carbohydrate food list.    -Plan a carbohydrate-controlled meal based on individual needs/preferences from a Registered Dietitian.   1 [] All       []    []    []  []      []        []                      Developing strategies for problem solving to promote health/change behavior. -Identify 7 self-care behaviors; Personal health risk factors; Benefits, challenges & strategies for behavioral change and set an individualized goal selection. 1       []      []Nutrition  []Monitoring  []Exercise  []Medication  []Other     Identified Barriers to learning/adherence to self management plan:    None  []  other    Instruction Method:  Lecture/Discussion, Handouts, and Return demonstration     Supporting Education Materials/Equipment Provided: Self-management manual and Nutritional Packet   []Armenian materials       [] services     []Other:      Encounter Type Date Attended Start Time End Time Comments No Show Dates   Assessment 3/7/23 SE         Session 1 3/7/23  0274 2000  In person    Session 2        1:1 DSMES          In person Follow-up         Gestational Diabetes         DSMES #3        Meter Instrx        Insulin Instrx           Additional Comments: [] Pt seen individually due to Covid-19 Safety precautions and no group session available.         Date:   Follow-up goal attainment based on patients initial DSMES goal    Dr Notified by [] EMR []Fax        []Post class Hgb A1C  []Medication compliance   []Plate method/meal plan compliance   []Able to state the number of Carbohydrate servings eaten at B,L,D   []Testing blood glucose as prescribed by PCP   []Exercise Routine   []Other:   []Other:     []Patient lost to follow-up  Dr Notified by []EMR []Fax

## 2023-03-09 NOTE — PROGRESS NOTES
Diabetes Self-Management Education Record    Participant Name: Ottoniel Bosch  Referring Provider: Ronn Yang MD  Assessment/Evaluation Ratings:  1=Needs Instruction   4=Demonstrates Understanding/Competency  2=Needs Review   NC=Not Covered    3=Comprehends Key Points  N/A=Not Applicable  Topics/Learning Objectives Pre-session Assess Date:  Instructor initials/date  3/7/23 PC Instr. Date  3/7/23 PC  Instructor initials/date Follow-up Post- session Eval Comments   Diabetes disease process & Treatment process:   -Define type of diabetes in simple terms.  - Describe the ABCs of  diabetes management  -Identify own type of diabetes  -Identify lifestyle changes/treatment options  -other:  1 [x] All     []  []  []  []  []  3 3/7/23 PC  New Dx of Type 2 DM  No family hx  Hx of GDM 2017   Developing strategies for Healthy coping/psychosocial issues:    -Describe feelings about living with diabetes  -Identify coping strategies and sources of stress  -Identify support needed & support network available  -Complete PAID-5 Diabetes questionnaire 1  All   []  []  []  []    []  3 Date:3/7/23   PAID-5 Score:6  Confidence Score: 3           Prevention, detection & treatment of Chronic complications:    -Identify the prevention, detection and treatment for complications including immunizations, preventive eye, foot, dental and renal exams as indicated per the participant's duration of diabetes and health status.  -Define the natural course of diabetes and the relationship of blood glucose levels to long term complications of diabetes.   1 [x] All     []            []  3 3/7/23 PC  Denies  (Hypertension with pregnancy)   Prevention, detection & treatment of acute complications:    -State the causes,signs & symptoms of hyper & hypoglycemia, and prevention & treatment strategies.   -Describe sick day guidelines  DKA /indications for ketone testing &  when to call physician  1 [x] All     []      []    3   3/7/23 PC  No lows  Hyperglycemia but not over 300 mg/dl         -Identify severe weather/situation crisis  & diabetes supplies management  []      Using medications safely:   -State effects of diabetes medicines on blood glucose levels;  -List diabetes medication taken, action & side effects 1 [x] All     []  []  3 3/7/23 PC  No DM medications at this time     Insulin/Injectables/glucagon  -Name appropriate injection sites; proper storage; supplies needed;  NA   []       Demonstrate proper technique  []      Monitoring blood glucose, interpreting and using results:   -Identify the purpose of testing   -Identify recommended & personal blood glucose targets & HgbA1C target levels  -State the Importance of logging blood glucose levels for pattern recognition;   -State benefits of reading/using pt generated health data  -Verbalize safe lancet disposal 1 [x] All     []  []    []  []  []  3 . 3/7/23 PC  CGM with average 132 mg/dl (124 - 165)  2 hr PP average 163 mg/dl (140-230)  Stated A1C 7.4%   -Demonstrate proper testing technique  []      Incorporating physical activity into lifestyle:   -State effect of exercise on blood glucose levels;   -State benefits of regular exercise;   -Define safety considerations/food choices if needed.  -Describe contraindications/maintenance of activity.  1 [x] All     []  []    []  []  3 3/7/23 PC  Aerobics, walking, wt lifting  3 - 5 days a week  60 minute sessions   Incorporating nutritional management into lifestyle:   -Describe effect of type, amount & timing of food on blood glucose  -Describe methods for preparing and planning   healthy meals  -Correctly read food labels for nutritional values  -Name 3 foods high in Carbohydrate  -Plan a sample 4 carbohydrate-controlled meal using Diabetes Plate Method  -Verbalized ability to measure and count carbohydrate gram servings using food labels and carbohydrate food list.    -Plan a carbohydrate-controlled meal based on individual needs/preferences from a Registered Dietitian.   1 [] All       [x]    [x]    [x]  [x]      [x]        []  3 3/8/23 SE  Reviewed nutritional recommendations for salt, added sugar, fats, and fiber. Reviewed plate method and portion control. Reviewed which foods contain carbohydrates, how carbs impact blood glucose, how to count carbs, and how to spread carbs evenly throughout the day. Label reading reviewed. Patient very attentive to education and demonstrated understanding. Asked questions and took notes. Developing strategies for problem solving to promote health/change behavior. -Identify 7 self-care behaviors; Personal health risk factors; Benefits, challenges & strategies for behavioral change and set an individualized goal selection. 1       [x]  3  3/8/23 SE  [x]Nutrition-Plate method  []Monitoring  []Exercise  []Medication  []Other     Identified Barriers to learning/adherence to self management plan:    None  []  other    Instruction Method:  Lecture/Discussion, Handouts, and Return demonstration     Supporting Education Materials/Equipment Provided: Self-management manual and Nutritional Packet   []Indian materials       [] services     []Other:      Encounter Type Date Attended Start Time End Time Comments No Show Dates   Assessment 3/7/23 SE         Session 1 3/7/23  0136 2000  In person    Session 2 3/8/23 SE 1730 2000 In person    1:1 DSMES          In person Follow-up         Gestational Diabetes         DSMES #3        Meter Instrx        Insulin Instrx           Additional Comments: [] Pt seen individually due to Covid-19 Safety precautions and no group session available.         Date:   Follow-up goal attainment based on patients initial DSMES goal    Dr Notified by [] EMR []Fax        []Post class Hgb A1C  []Medication compliance   []Plate method/meal plan compliance   []Able to state the number of Carbohydrate servings eaten at B,L,D   []Testing blood glucose as prescribed by PCP   []Exercise Routine   []Other:   []Other:     []Patient lost to follow-up  Dr Notified by []EMR []Fax

## 2023-06-30 ENCOUNTER — FOLLOWUP TELEPHONE ENCOUNTER (OUTPATIENT)
Dept: DIABETES SERVICES | Age: 38
End: 2023-06-30

## 2024-04-18 NOTE — PROGRESS NOTES
Normal tone;                                           No atrophy                                           No abnormal movements                                                No deficit to touch                       LEFT ARM : No tenderness or masses or defect                                          Full range of motion, with no pain or contracture, dislocation or laxity;                                          Normal tone                                           No atrophy                                          No abnormal movements                                           No deficit to touch      SKIN: Skin and subcu of chest, breast, arms with: No lesions                                                                                  No rashes                                                                                  No ulcers             Skin and subcu of chest, breast, and arms with :  No induration                                                                                               No nodules                                                                                              No tightening.    EXTREMITIES: No clubbing, cyanosis, edema    NEURO: CN II to XII grossly intact    Alert and oriented x4                         Breasts:                 Fibrocystic                                            Symmetric                                              No masses or tenderness on palpation                     SKIN: No lesions                No AXILLARY adenopathy                                                                              Pelvic exam:                 External genitalia:   No lesions      Urethra:   No lesions    Bladder:  Nontender to palpation    Vagina: No lesions               No discharge    Cervix: No lesions               Uterus:: ANTE  -verted                                             __10_____    weeks sized

## 2024-04-24 ENCOUNTER — OFFICE VISIT (OUTPATIENT)
Age: 39
End: 2024-04-24
Payer: COMMERCIAL

## 2024-04-24 VITALS
DIASTOLIC BLOOD PRESSURE: 90 MMHG | WEIGHT: 204 LBS | HEART RATE: 97 BPM | SYSTOLIC BLOOD PRESSURE: 132 MMHG | BODY MASS INDEX: 38.55 KG/M2

## 2024-04-24 DIAGNOSIS — N92.0 EXCESSIVE OR FREQUENT MENSTRUATION: ICD-10-CM

## 2024-04-24 DIAGNOSIS — N93.0 POSTCOITAL BLEEDING: ICD-10-CM

## 2024-04-24 DIAGNOSIS — Z12.4 CERVICAL CANCER SCREENING: Primary | ICD-10-CM

## 2024-04-24 DIAGNOSIS — R10.32 BILATERAL LOWER ABDOMINAL PAIN: ICD-10-CM

## 2024-04-24 DIAGNOSIS — R10.31 BILATERAL LOWER ABDOMINAL PAIN: ICD-10-CM

## 2024-04-24 PROCEDURE — 99396 PREV VISIT EST AGE 40-64: CPT | Performed by: LEGAL MEDICINE

## 2024-04-24 PROCEDURE — 99214 OFFICE O/P EST MOD 30 MIN: CPT | Performed by: LEGAL MEDICINE

## 2024-04-24 RX ORDER — GLIPIZIDE 5 MG/1
5 TABLET ORAL
COMMUNITY

## 2024-04-24 SDOH — ECONOMIC STABILITY: INCOME INSECURITY: HOW HARD IS IT FOR YOU TO PAY FOR THE VERY BASICS LIKE FOOD, HOUSING, MEDICAL CARE, AND HEATING?: NOT HARD AT ALL

## 2024-04-24 SDOH — ECONOMIC STABILITY: HOUSING INSECURITY
IN THE LAST 12 MONTHS, WAS THERE A TIME WHEN YOU DID NOT HAVE A STEADY PLACE TO SLEEP OR SLEPT IN A SHELTER (INCLUDING NOW)?: NO

## 2024-04-24 SDOH — ECONOMIC STABILITY: FOOD INSECURITY: WITHIN THE PAST 12 MONTHS, YOU WORRIED THAT YOUR FOOD WOULD RUN OUT BEFORE YOU GOT MONEY TO BUY MORE.: NEVER TRUE

## 2024-04-24 SDOH — ECONOMIC STABILITY: FOOD INSECURITY: WITHIN THE PAST 12 MONTHS, THE FOOD YOU BOUGHT JUST DIDN'T LAST AND YOU DIDN'T HAVE MONEY TO GET MORE.: NEVER TRUE

## 2024-04-24 ASSESSMENT — ENCOUNTER SYMPTOMS
CONSTIPATION: 0
RECTAL PAIN: 0
ABDOMINAL DISTENTION: 0
DIARRHEA: 0
ABDOMINAL PAIN: 0
VOMITING: 0
NAUSEA: 0
BLOOD IN STOOL: 0

## 2024-04-24 ASSESSMENT — PATIENT HEALTH QUESTIONNAIRE - PHQ9
SUM OF ALL RESPONSES TO PHQ QUESTIONS 1-9: 0
2. FEELING DOWN, DEPRESSED OR HOPELESS: NOT AT ALL
SUM OF ALL RESPONSES TO PHQ QUESTIONS 1-9: 0
SUM OF ALL RESPONSES TO PHQ9 QUESTIONS 1 & 2: 0
SUM OF ALL RESPONSES TO PHQ QUESTIONS 1-9: 0
SUM OF ALL RESPONSES TO PHQ QUESTIONS 1-9: 0
1. LITTLE INTEREST OR PLEASURE IN DOING THINGS: NOT AT ALL

## 2024-04-24 NOTE — PATIENT INSTRUCTIONS
Please have the studies ordered in the next  4  weeks.    For ultrasound or any x-rays, you can have them done at [facility].  You will need to call the radiology department to schedule those.  Please make sure my staff gives you the phone number for radiology before you leave.  If you opt to have the studies done at a different facility, please ask that facility to fax your results to my office.  The fax number here is: [phone].    For labs, you may have them done at [facility].  They are open 8 AM to 5 PM Monday through Friday.  If you opt to have your labs done at a different facility, please ask the facility to fax your results to my office.  The fax number here is: [phone].    I will call you with the reports.      Please call the office if I have not contacted you with the reports by 2 weeks after you have had them done.

## 2024-04-25 LAB
HPV SAMPLE: NORMAL
HPV SOURCE: NORMAL
HPV, GENOTYPE 16: NORMAL
HPV, GENOTYPE 18: NORMAL
HPV, HIGH RISK OTHER: NORMAL
HPV, INTERPRETATION: NORMAL

## 2024-04-29 LAB
GYNECOLOGY CYTOLOGY REPORT: NORMAL
HPV SAMPLE: NORMAL
HPV SOURCE: NORMAL
HPV, GENOTYPE 16: NOT DETECTED
HPV, GENOTYPE 18: NOT DETECTED
HPV, HIGH RISK OTHER: NOT DETECTED
HPV, INTERPRETATION: NORMAL

## 2024-05-14 ENCOUNTER — HOSPITAL ENCOUNTER (OUTPATIENT)
Dept: ULTRASOUND IMAGING | Age: 39
Discharge: HOME | End: 2024-05-14
Payer: COMMERCIAL

## 2024-05-14 DIAGNOSIS — R10.32 BILATERAL LOWER ABDOMINAL PAIN: ICD-10-CM

## 2024-05-14 DIAGNOSIS — N93.0 POSTCOITAL BLEEDING: ICD-10-CM

## 2024-05-14 DIAGNOSIS — R10.31 BILATERAL LOWER ABDOMINAL PAIN: ICD-10-CM

## 2024-05-14 PROCEDURE — 76830 TRANSVAGINAL US NON-OB: CPT

## 2024-05-15 ENCOUNTER — TELEPHONE (OUTPATIENT)
Age: 39
End: 2024-05-15

## 2024-05-15 NOTE — TELEPHONE ENCOUNTER
----- Message from Pearl Leiva MD sent at 5/15/2024  9:23 AM EDT -----  Please call patient.  5/15/2024 fi    Report shows a 1.25 inch fibroid.  Call if bleeding is worsening, and will need endometrial biopsy.  Otherwise, keep follow-up in 1 year.        Thank you

## 2024-05-15 NOTE — RESULT ENCOUNTER NOTE
Please call patient.  5/15/2024 fi    Report shows a 1.25 inch fibroid.  Call if bleeding is worsening, and will need endometrial biopsy.  Otherwise, keep follow-up in 1 year.        Thank you

## 2024-09-10 ENCOUNTER — TRANSCRIBE ORDERS (OUTPATIENT)
Age: 39
End: 2024-09-10

## 2024-09-10 DIAGNOSIS — I49.9 CARDIAC ARRHYTHMIA, UNSPECIFIED CARDIAC ARRHYTHMIA TYPE: Primary | ICD-10-CM

## 2024-09-27 ENCOUNTER — HOSPITAL ENCOUNTER (OUTPATIENT)
Age: 39
Discharge: HOME | End: 2024-09-29
Attending: INTERNAL MEDICINE
Payer: COMMERCIAL

## 2024-09-27 DIAGNOSIS — I49.9 CARDIAC ARRHYTHMIA, UNSPECIFIED CARDIAC ARRHYTHMIA TYPE: ICD-10-CM

## 2024-09-27 LAB
STRESS BASELINE DIAS BP: 62 MMHG
STRESS BASELINE HR: 82 BPM
STRESS BASELINE ST DEPRESSION: 0 MM
STRESS BASELINE SYS BP: 133 MMHG
STRESS ESTIMATED WORKLOAD: 10.1 METS
STRESS EXERCISE DUR MIN: 8 MIN
STRESS EXERCISE DUR SEC: 54 SEC
STRESS O2 SAT PEAK: 97 %
STRESS O2 SAT REST: 97 %
STRESS PEAK DIAS BP: 72 MMHG
STRESS PEAK SYS BP: 199 MMHG
STRESS PERCENT HR ACHIEVED: 96 %
STRESS POST PEAK HR: 171 BPM
STRESS RATE PRESSURE PRODUCT: NORMAL BPM*MMHG
STRESS STAGE 1 BP: NORMAL MMHG
STRESS STAGE 1 COMMENTS: NORMAL
STRESS STAGE 1 DURATION: 3 MIN:SEC
STRESS STAGE 1 HR: 117 BPM
STRESS STAGE 2 BP: NORMAL MMHG
STRESS STAGE 2 COMMENTS: NORMAL
STRESS STAGE 2 DURATION: 3 MIN:SEC
STRESS STAGE 2 HR: 142 BPM
STRESS STAGE 3 BP: NORMAL MMHG
STRESS STAGE 3 COMMENTS: NORMAL
STRESS STAGE 3 DURATION: 3 MIN:SEC
STRESS STAGE 3 HR: 171 BPM
STRESS STAGE RECOVERY 1 BP: NORMAL MMHG
STRESS STAGE RECOVERY 1 COMMENTS: NORMAL
STRESS STAGE RECOVERY 1 DURATION: 1 MIN:SEC
STRESS STAGE RECOVERY 1 HR: 140 BPM
STRESS STAGE RECOVERY 2 BP: NORMAL MMHG
STRESS STAGE RECOVERY 2 COMMENTS: NORMAL
STRESS STAGE RECOVERY 2 DURATION: 1 MIN:SEC
STRESS STAGE RECOVERY 2 HR: 127 BPM
STRESS STAGE RECOVERY 3 BP: NORMAL MMHG
STRESS STAGE RECOVERY 3 COMMENTS: NORMAL
STRESS STAGE RECOVERY 3 DURATION: 1 MIN:SEC
STRESS STAGE RECOVERY 3 HR: 120 BPM
STRESS STAGE RECOVERY 4 BP: NORMAL MMHG
STRESS STAGE RECOVERY 4 COMMENTS: NORMAL
STRESS STAGE RECOVERY 4 DURATION: 1 MIN:SEC
STRESS STAGE RECOVERY 4 HR: 109 BPM
STRESS TARGET HR: 179 BPM

## 2024-09-27 PROCEDURE — 93017 CV STRESS TEST TRACING ONLY: CPT

## (undated) DEVICE — STERILE POLYISOPRENE POWDER-FREE SURGICAL GLOVES WITH EMOLLIENT COATING: Brand: PROTEXIS

## (undated) DEVICE — STANDARD HYPODERMIC NEEDLE,POLYPROPYLENE HUB: Brand: MONOJECT

## (undated) DEVICE — BLADE CLIPPER GEN PURP NS

## (undated) DEVICE — LINER,SOFT,SUCTION CANISTER,1500CC: Brand: MEDLINE

## (undated) DEVICE — SUTURE VCRL SZ 4-0 L18IN ABSRB UD L19MM PS-2 3/8 CIR PRIM J496H

## (undated) DEVICE — SCALPEL SURG NO10 S STL ABS PLAS HNDL DISPOSABLE

## (undated) DEVICE — CHLORAPREP 26ML ORANGE

## (undated) DEVICE — SLEEVE COMPRESS STD CALF KNEE SCD

## (undated) DEVICE — SUTURE VCRL SZ 3-0 L36IN ABSRB VLT CT L40MM 1/2 CIR J356H

## (undated) DEVICE — ELECTRODE PT RET AD L9FT HI MOIST COND ADH HYDRGEL CORDED

## (undated) DEVICE — AMD ANTIMICROBIAL BANDAGE ROLL,6 PLY: Brand: KERLIX

## (undated) DEVICE — SUTURE CHROMIC GUT 0 L36IN CT-1 L36MM 1/2 CIR TAPERPOINT 924H

## (undated) DEVICE — TUBE BLD COLLECT ST 1 SIL COAT 7ML 10ML

## (undated) DEVICE — 3M™ STERI-STRIP™ ELASTIC SKIN CLOSURES, E4547, 1/2 IN X 4 IN (12 MM X 100 MM), 6 STRIPS/ENVELOPE: Brand: 3M™ STERI-STRIP™

## (undated) DEVICE — SUTURE ABSRB CHROMIC GUT MFIL CTX NO 1 27IN 865H

## (undated) DEVICE — SUTURE VCRL + SZ 0 L36IN ABSRB VLT L36MM CT-1 1/2 CIR VCP346H

## (undated) DEVICE — GOWN,SIRUS,FABRNF,XL,20/CS: Brand: MEDLINE

## (undated) DEVICE — CESAREAN BIRTH PACK: Brand: MEDLINE INDUSTRIES, INC.

## (undated) DEVICE — BINDER ABD M/L H12IN FOR 46-62IN WHT 4 SLD PNL DSGN HOOP

## (undated) DEVICE — SCALPEL SURG NO21 S STL STR W/ INTEGR MTRC RUL DISP